# Patient Record
Sex: MALE | Race: WHITE
[De-identification: names, ages, dates, MRNs, and addresses within clinical notes are randomized per-mention and may not be internally consistent; named-entity substitution may affect disease eponyms.]

---

## 2018-07-16 ENCOUNTER — HOSPITAL ENCOUNTER (OUTPATIENT)
Dept: PHYSICAL THERAPY | Facility: CLINIC | Age: 78
Setting detail: THERAPIES SERIES
Discharge: HOME | End: 2018-07-16
Payer: MEDICARE

## 2018-07-16 PROCEDURE — 97110 THERAPEUTIC EXERCISES: CPT

## 2018-07-16 PROCEDURE — G8979 MOBILITY GOAL STATUS: HCPCS

## 2018-07-16 PROCEDURE — G8978 MOBILITY CURRENT STATUS: HCPCS

## 2018-07-16 PROCEDURE — 97162 PT EVAL MOD COMPLEX 30 MIN: CPT

## 2018-07-16 NOTE — FLOWSHEET NOTE
Victor Manuel. Kevin Deedee for [facility]  [address], [facility].  Phone: [phone]  Fax:     [phone]    Physical Therapy Evaluation    Date:  2018  Patient: Nakul Gonzalez  : 1940  MRN: [id number]  Physician: [address]: Medicare  Medical Diagnosis: L lumbar adiculopathy    Rehab Codes: M54.32  Onset Date:                                    Subjective:  Pt reports pain, stiffness of L lumbar region, notes episodes of radiating pain into leg, but not specific to any position or motion. Pt states symptoms began last month while cuing down a large tree, felt strain in back when lifting large log and twisting to R side. No previous hx of back injuries, previous hx of R leg fractue.     PMHx: [] Unremarkable [] Diabetes [] HTN  [] Pacemaker   [] MI/Heart Problems [] Cancer [] Arthritis [] Asthma                         [x] refer to full medical chart  In [facility]  [] Other:        Tests: [x] X-Ray: DDD [] MRI:  [] Other:    Medications: [x] Refer to full medical record [] None [] Other:  Allergies:      [x] Refer to full medical record [] None [] Other:    Function:  Hand Dominance  [x] Right  [] Left  Working:  [] Normal Duty  [] Light Duty  [] Off D/T Condition  [x] Retired     [] Not Employed  []  Disability  [] Other:            Return to work:   Job/ADL Description:    Pain:  [x] Yes  [] No Pain Rating: (0-10 scale) 1/10  Pain altered Tx:  [] Yes  [x] No  Action:    Symptoms:  [] Improving [] Worsening [x] Same  Better:  [] AM    [] PM    [] Sit    [] Rise/Sit    []Stand    [] Walk    [] Lying    [] Other:  Worse: [x] AM    [x] PM    [x] Sit    [x] Rise/Sit    [x]Stand    [x] Walk    [x] Lying    [x] Bend                      [] Valsalva    [] Other:  Sleep: [x] OK    [] Disturbed    Objective:     L/R ROM  ° A/P STRENGTH   Lumbar Flex 80  4-    Ext 10  4    SB 20 20     Rot 70 70     Hip Flex WNL WNL 4 4   Ext WNL WNL 4- 4-     OBSERVATION No Deficit Deficit Not Tested Comments   Posture [x]      Palpation [] [x]     Sensation [x] []       FUNCTION Normal Difficult Unable   Sitting  [x] [] []   Ambulation [] [x] []   Bending [] [x] []   lifting [] [x] []     G CODE FUNCTIONAL SCORE CURRENT GOAL   Extremity Functional Scale Score 14 0   SEVERITY CODE     Mobility: Walking, Moving Around CI: 1-19% impaired (65-79) CH: 0% Impaired (80)       ASSESSMENT   Pt limited by L lumbar pain, ROM loss, neg SLR, neg repeated flex/ext, lumbopelvic weakness w/ limitation in functional activity d/t lumbar strain. Pt able to complete flexion exercises w/o leg symptoms, but noted leg symptoms w/ prone extension. Will focus on flexion exercises. PROBLEMS  Lumbar, L leg pain  Lumbar ROM loss  Lumbar, hip weakness  Lumbar functional deficits    SHORT TERM GOALS ( 8 visits)  Lumbar, L leg pain = 0  Lumbar ROM = WNL  Lumbar, hip strength = 4+/5  Lumbar function: walk, bend, lift w/o pain    LONG TERM GOALS ( 12 visits)  Independent Home Exercise program  Return to normal activity    PATIENT GOAL  Reduce pain, increase flexibility    Rehab Potential:  [x] Good  [] Fair  [] Poor   Suggested Professional Referral:  [x] No  [] Yes:  Barriers to Goal Achievement[de-identified]  [x] No  [] Yes:  Domestic Concerns:  [x] No  [] Yes:    Pt. Education:  [x] Plans/Goals, Risks/Benefits discussed  [x] Home exercise program  Method of Education: [x] Verbal  [x] Demo  [x] Written  Comprehension of Education:  [x] Verbalizes understanding. [x] Demonstrates understanding. [] Needs Review. [] Demonstrates/verbalizes understanding of HEP/Ed previously given.     Treatment Plan:  [x] Therapeutic Exercise    [] Modalities:  [] Therapeutic Activity    [] Ultrasound  [] Electrical Stimulation  [] Gait Training     [] Massage       [] Lumbar/Cervical Traction  [] Neuromuscular Re-education [x] Cold/hotpack [] Instruction in HEP  [] Manual Therapy   [] Aquatic Therapy [] Other:     [] Iontophoresis: 4 mg/mL Dexamethasone Sodium Phosphate 40-80 mAmin  [] Drug allergies reviewed    _______Initials           _______Date     Frequency:  2 x/week for 12 visits    Todays Treatment:     7/16/2018 Visit #1    Exercise Reps/ Time Weight/ Level Comments   Bike 10 min     Stretch DKTC, LTR 10x10s     Bridges 3x10     Pelvic Tilts 3x10         Specific Instructions for next treatment:    Treatment Charges: Mins Units   [x] Evaluation ----- 1   []  Modalities     [x]  Ther Exercise 20 1   []  Manual Therapy     []  Ther Activities     []  Aquatics     []  Other       Time in: 1500     Time out: 1600    Electronically signed by: Lakeshia Mayer PT        Physician Signature:________________________________Date:__________________  By signing above, I have reviewed this plan of care and certify a need for medically   necessary rehabilitation services.      *PLEASE SIGN ABOVE AND FAX BACK ALL PAGES*

## 2018-07-18 ENCOUNTER — HOSPITAL ENCOUNTER (OUTPATIENT)
Dept: PHYSICAL THERAPY | Facility: CLINIC | Age: 78
Setting detail: THERAPIES SERIES
Discharge: HOME | End: 2018-07-18
Payer: MEDICARE

## 2018-07-18 PROCEDURE — 97110 THERAPEUTIC EXERCISES: CPT

## 2018-07-18 NOTE — FLOWSHEET NOTE
cueing with clams and SL hip abduction to ensure proper follow through of technique. Pt denied any increased LBP with completion of treatment with a slight decrease in stiffness noted upon arriving. [] No change. [] Other:     SHORT TERM GOALS ( 8 visits)  Lumbar, L leg pain = 0  Lumbar ROM = WNL  Lumbar, hip strength = 4+/5  Lumbar function: walk, bend, lift w/o pain     LONG TERM GOALS ( 12 visits)  Independent Home Exercise program  Return to normal activity     PATIENT GOAL  Reduce pain, increase flexibility    Pt. Education:  [x] Yes  [] No  [] Reviewed Prior HEP/Ed  Method of Education: [x] Verbal  [x] Demo  [] Written  Comprehension of Education:  [x] Verbalizes understanding. [x] Demonstrates understanding. [] Needs review. [] Demonstrates/verbalizes HEP/Ed previously given. Plan: [x] Continue per plan of care.    [] Other:      Time In: 2:02 pm            Time Out: 3:00 pm    Electronically signed by:  Gayle Melchor PTA

## 2018-07-23 ENCOUNTER — HOSPITAL ENCOUNTER (OUTPATIENT)
Dept: PHYSICAL THERAPY | Facility: CLINIC | Age: 78
Setting detail: THERAPIES SERIES
Discharge: HOME | End: 2018-07-23
Payer: MEDICARE

## 2018-07-23 PROCEDURE — 97110 THERAPEUTIC EXERCISES: CPT

## 2018-07-23 NOTE — FLOWSHEET NOTE
stiffness but denied any pain or soreness. [] No change. [] Other:     SHORT TERM GOALS ( 8 visits)  Lumbar, L leg pain = 0  Lumbar ROM = WNL  Lumbar, hip strength = 4+/5  Lumbar function: walk, bend, lift w/o pain     LONG TERM GOALS ( 12 visits)  Independent Home Exercise program  Return to normal activity     PATIENT GOAL  Reduce pain, increase flexibility    Pt. Education:  [x] Yes  [] No  [] Reviewed Prior HEP/Ed  Method of Education: [x] Verbal  [x] Demo  [] Written  Comprehension of Education:  [x] Verbalizes understanding. [x] Demonstrates understanding. [] Needs review. [] Demonstrates/verbalizes HEP/Ed previously given. Plan: [x] Continue per plan of care.    [] Other:      Time In: 2:00 pm            Time Out: 3:00 pm    Electronically signed by:  Milan Zuniga PTA

## 2018-07-26 ENCOUNTER — HOSPITAL ENCOUNTER (OUTPATIENT)
Dept: PHYSICAL THERAPY | Facility: CLINIC | Age: 78
Setting detail: THERAPIES SERIES
Discharge: HOME | End: 2018-07-26
Payer: MEDICARE

## 2018-07-26 PROCEDURE — 97110 THERAPEUTIC EXERCISES: CPT

## 2018-07-26 NOTE — FLOWSHEET NOTE
with opposite UE/LE. Addition of marches and HS curls with cuing for core engagement. Provided with updated HEP, verbalized understanding of ex provided. [] No change. [] Other:     SHORT TERM GOALS ( 8 visits)  Lumbar, L leg pain = 0  Lumbar ROM = WNL  Lumbar, hip strength = 4+/5  Lumbar function: walk, bend, lift w/o pain     LONG TERM GOALS ( 12 visits)  Independent Home Exercise program  Return to normal activity     PATIENT GOAL  Reduce pain, increase flexibility    Pt. Education:  [x] Yes  [] No  [] Reviewed Prior HEP/Ed  Method of Education: [x] Verbal  [x] Demo  [x] Written: copy in chart  Comprehension of Education:  [x] Verbalizes understanding. [x] Demonstrates understanding. [] Needs review. [] Demonstrates/verbalizes HEP/Ed previously given. Plan: [x] Continue per plan of care.    [] Other:      Time In: 2:00 pm            Time Out: 3:00 pm    Electronically signed by:  Miguelito Campos PTA

## 2018-07-30 ENCOUNTER — HOSPITAL ENCOUNTER (OUTPATIENT)
Dept: PHYSICAL THERAPY | Facility: CLINIC | Age: 78
Setting detail: THERAPIES SERIES
Discharge: HOME | End: 2018-07-30
Payer: MEDICARE

## 2018-07-30 PROCEDURE — 97110 THERAPEUTIC EXERCISES: CPT

## 2018-07-30 NOTE — PRE-CERTIFICATION NOTE
Medicare Cap   [x] Physical Therapy  [] Speech Therapy  [] Occupational therapy  *PT and Speech caps combine      $2010 Cap limit < kx modifier needed < $0657 requires pre-cert        Patient Name: Fredia Goodpasture  YOB: 1940    Note:  This is an estimate of charges billed.      Date of [address] Name # units/ charge $$$ charge Daily Total Charge Ongoing Total $$$   7/16/18 saige Palacios 1,1 80.57+22.99 103.56 103.56   7/18/18 therex 2 29.49+22.99 52.48 156.04   7/23/18 therex 2 29.49+22.99 52.48 208.52   7/26/18 therex 2 29.49+22.99 52.48 260.00   7/30/18 therex 3 11.29+14.96*6 75.47 335.47

## 2018-07-30 NOTE — FLOWSHEET NOTE
[x] Victor Manuel. [address] [facility] [employer] [facility]  [address] [place] [address]   Phone: [phone]   Fax:  [phone]     Physical Therapy Daily Treatment Note    Date:  2018  Patient Name:  Nakul Gonzalez    :  1940  MRN: [id number]  Physician: [address]: Medicare  Medical Diagnosis: L lumbar radiculopathy                Rehab Codes: M54.32  Onset Date:          Visit# / total visits:    Cancels/No Shows: 0/0    Subjective:    Pain:  [x] Yes  [] No Location: LBP Pain Rating: (0-10 scale) 2/10  Pain altered Tx:  [x] No  [] Yes  Action:  Comments: Some minor LB stiffness but states that's common for him if he is in one position for prolonged time. Overall doing good this afternoon. Objective:       Todays Treatment:     Modalities:   Precautions:    2018 Visit #6     Exercise Reps/ Time Weight/ Level Comments   Bike 10 min             Stretch DKTC, LTR, piriformis, HS S 3x30\"  5x10\" LTR       Calf S 3x30\"     Bridges 2x15 red      Clams 2x10 red    SL hip abduction 2x10 red          Pelvic Tilts HEP       DLS: marches, LE kick,  UE/LE x20 ea  1# LE                TGym Squats 2x15 L20    4 way hip x15 red bilat   Mini lunge x10   bilat   Marches x20     HS curls x20           Seated SB marches x15  Blue SB               Specific Instructions for next treatment:      Treatment Charges: Mins Units   []  Modalities     [x]  Ther Exercise 40 3   []  Manual Therapy     []  Ther Activities     []  Aquatics     []  Vasocompression     []  Other     Total Treatment time 40 3       Assessment: [x] Progressing toward goals. Continued with therex per log with good pt tolerance. Bike today for warmup, pt states he prefers the bike because its less stressful on his R knee. Increased TGym level along with increased reps with 4 way hip. Also instructed pt in mini lunges bilaterally for continued LE strengthening, required increased cueing to ensure proper technique.

## 2018-08-01 ENCOUNTER — HOSPITAL ENCOUNTER (OUTPATIENT)
Dept: PHYSICAL THERAPY | Facility: CLINIC | Age: 78
Setting detail: THERAPIES SERIES
Discharge: HOME | End: 2018-08-01
Payer: MEDICARE

## 2018-08-01 PROCEDURE — 97110 THERAPEUTIC EXERCISES: CPT

## 2018-08-06 ENCOUNTER — HOSPITAL ENCOUNTER (OUTPATIENT)
Dept: PHYSICAL THERAPY | Facility: CLINIC | Age: 78
Setting detail: THERAPIES SERIES
Discharge: HOME | End: 2018-08-06
Payer: MEDICARE

## 2018-08-06 PROCEDURE — 97110 THERAPEUTIC EXERCISES: CPT

## 2018-08-06 NOTE — FLOWSHEET NOTE
[x] Joana [facility]  [address] [facility] [address]   Phone: [phone]   Fax:  [phone]     Physical Therapy Daily Treatment Note    Date:  2018  Patient Name:  Rochelle Phipps    :  1940  MRN: [id number]  Physician: [address]: Medicare  Medical Diagnosis: L lumbar radiculopathy                Rehab Codes: M54.32  Onset Date:          Visit# / total visits:    Cancels/No Shows: 0/0    Subjective:    Pain:  [x] Yes  [] No Location: LBP Pain Rating: (0-10 scale) 2/10  Pain altered Tx:  [x] No  [] Yes  Action:  Comments: Pt still reporting LB stiffness upon arriving to the clinic this afternoon. Per pt stiffness has not increased but still present. Objective:       Todays Treatment:     Modalities:   Precautions:    2018 Visit #8     Exercise Reps/ Time Weight/ Level Comments   Bike 10 min             Stretch SKTC, LTR, piriformis, HS S 3x30\"  5x10\" LTR       Calf S 3x30\"           SLR x20 2#    Bridges 2x15 red      Clams 2x10 red    SL hip abduction 2x10 red          Pelvic Tilts HEP       DLS: marches, LE kick,  UE/LE x20 ea  2# LE/UE                TGym Squats/HR 2x15 L20    4 way hip x15 red bilat   Mini lunge x10   bilat   Sidestepping  3L red // bars   PMT back ext 3x10 30#                Specific Instructions for next treatment:      Treatment Charges: Mins Units   []  Modalities     [x]  Ther Exercise 30 2   []  Manual Therapy     []  Ther Activities     []  Aquatics     []  Vasocompression     []  Other     Total Treatment time 30 2       Assessment: [x] Progressing toward goals. Continued with therex with good tolerance. Verbal cueing for recall and technique to ensure proper core engagement throughout. Added PMT lumbar ext ex for low back strengthening, good challenge with no c/o pain. [] No change.      [] Other:     SHORT TERM GOALS ( 8 visits)  Lumbar, L leg pain = 0  Lumbar ROM = WNL  Lumbar, hip strength

## 2018-08-06 NOTE — PRE-CERTIFICATION NOTE
Medicare Cap   [x] Physical Therapy  [] Speech Therapy  [] Occupational therapy  *PT and Speech caps combine      $2010 Cap limit < kx modifier needed < $9947 requires pre-cert        Patient Name: Kelley Lundberg  YOB: 1940    Note:  This is an estimate of charges billed.      Date of [address] Name # units/ charge $$$ charge Daily Total Charge Ongoing Total $$$   7/16/18 Philip theregloria 1,1 80.57+22.99 103.56 103.56   7/18/18 therex 2 29.49+22.99 52.48 156.04   7/23/18 therex 2 29.49+22.99 52.48 208.52   7/26/18 therex 2 29.49+22.99 52.48 260.00   7/30/18 therex 3 12.99+21.05*0 75.47 335.47   8/1/18 therex 3 29.49+22.99*2 75.47 410.94   8/6/18 therex 2 29.49+22.99 52.48 463.42

## 2018-08-08 ENCOUNTER — HOSPITAL ENCOUNTER (OUTPATIENT)
Dept: PHYSICAL THERAPY | Facility: CLINIC | Age: 78
Setting detail: THERAPIES SERIES
Discharge: HOME | End: 2018-08-08
Payer: MEDICARE

## 2018-08-08 PROCEDURE — 97110 THERAPEUTIC EXERCISES: CPT

## 2018-08-08 NOTE — FLOWSHEET NOTE
[x] [facility] [place]. [address] [place] [address] [employer]  [address] [place] [address]   Phone: [phone]   Fax:  [phone]     Physical Therapy Daily Treatment Note    Date:  2018  Patient Name:  Jemima Helton    :  1940  MRN: [id number]  Physician: [address]: Medicare  Medical Diagnosis: L lumbar radiculopathy                Rehab Codes: M54.32  Onset Date:          Visit# / total visits:    Cancels/No Shows: 0/0    Subjective:    Pain:  [x] Yes  [] No Location: LBP Pain Rating: (0-10 scale) 2/10  Pain altered Tx:  [x] No  [] Yes  Action:  Comments: Pt notes feeling better today. Less LB stiffness upon arriving to the clinic this afternoon. Objective:       Todays Treatment:     Modalities:   Precautions:    2018 Visit #8     Exercise Reps/ Time Weight/ Level Comments   Bike 10 min             Stretch SKTC, LTR, piriformis, HS S 3x30\"  5x10\" LTR       Calf S 3x30\"           SLR x20 2#    Bridges 2x15 red      Clams 2x10 red    SL hip abduction 2x10 red          Pelvic Tilts HEP       DLS: marches, LE kick,  UE/LE x20 ea  2# LE/UE                TGym Squats/HR 2x15 L20    4 way hip x20 red bilat   Mini lunge x15   bilat   Mini squats 2x10     Sidestepping  3L red // bars   PMT back ext 3x10 40#                Specific Instructions for next treatment:      Treatment Charges: Mins Units   []  Modalities     [x]  Ther Exercise 30 2   []  Manual Therapy     []  Ther Activities     []  Aquatics     []  Vasocompression     []  Other     Total Treatment time 30 2       Assessment: [x] Progressing toward goals. Continued with therex per log with good pt tolerance. Increased reps of 4 way hip, addition of mini squats, and increased weight with PMt lumbar ext ex to promote LE and core strengthening with good tolerance. Still requiring verbal cueing for recall throughout along with cues for proper technique. Still displaying LE weakness but improving.  Denied any

## 2018-08-08 NOTE — PRE-CERTIFICATION NOTE
Medicare Cap   [x] Physical Therapy  [] Speech Therapy  [] Occupational therapy  *PT and Speech caps combine      $2010 Cap limit < kx modifier needed < $1106 requires pre-cert        Patient Name: Nakul Gonzalez  YOB: 1940    Note:  This is an estimate of charges billed.      Date of [address] Name # units/ charge $$$ charge Daily Total Charge Ongoing Total $$$   7/16/18 saige Palacios 1,1 80.57+22.99 103.56 103.56   7/18/18 therex 2 29.49+22.99 52.48 156.04   7/23/18 therex 2 29.49+22.99 52.48 208.52   7/26/18 therex 2 29.49+22.99 52.48 260.00   7/30/18 therex 3 79.86+34.72*3 75.47 335.47   8/1/18 therex 3 29.49+22.99*2 75.47 410.94   8/6/18 therex 2 29.49+22.99 52.48 463.42   8/8/18 therex 2 29.49+22.99 52.48 515.90

## 2018-08-13 ENCOUNTER — HOSPITAL ENCOUNTER (OUTPATIENT)
Dept: PHYSICAL THERAPY | Facility: CLINIC | Age: 78
Setting detail: THERAPIES SERIES
Discharge: HOME | End: 2018-08-13
Payer: MEDICARE

## 2018-08-13 PROCEDURE — 97110 THERAPEUTIC EXERCISES: CPT

## 2018-08-13 NOTE — FLOWSHEET NOTE
[x] Victor Manuel. [address] [facility] [employer]  [address] [place] [address]   Phone: [phone]   Fax:  [phone]     Physical Therapy Daily Treatment Note    Date:  2018  Patient Name:  Duarte Santana    :  1940  MRN: [id number]  Physician: [address]: Medicare  Medical Diagnosis: L lumbar radiculopathy                Rehab Codes: M54.32  Onset Date:          Visit# / total visits:    Cancels/No Shows: 0/0    Subjective: Pt reports feeling improved w/ less back/leg pain overall, but has noted over last 3 wks that his left foot tends to darg while walking longer distances  Pain:  [x] Yes  [] No Location: LBP Pain Rating: (0-10 scale) 2/10  Pain altered Tx:  [x] No  [] Yes  Action:  Comments:     Objective:  MMT L ankle DF = 4-/5, EHL = 4-/5, PF = 4/5     Todays Treatment:     Modalities:   Precautions:    2018  Visit #9     Exercise Reps/ Time Weight/ Level Comments   Bike 10 min             Stretch SKTC, LTR, piriformis, HS S 3x30\"  5x10\" LTR       Calf S 3x30\"           SLR x20 2#    Bridges 2x15 red      Clams 2x10 red    SL hip abduction 2x10 red          Pelvic Tilts HEP       SB seated: hip flex, biceps curls, LAQ, delt raise x1x10  2# UE  0# LE Unable to combine motions   Tband DF 3x10 red          TGym L/R Squats/HR 2x15 L15    4 way hip x20 red bilat   BOSU lunge x15   fwd   Mini squats 2x10     Sidestepping  3L red    PMT back ext 3x10 40#                Specific Instructions for next treatment:      Treatment Charges: Mins Units   []  Modalities     [x]  Ther Exercise 30 2   []  Manual Therapy     []  Ther Activities     []  Aquatics     []  Vasocompression     []  Other     Total Treatment time 30 2       Assessment: [x] Progressing toward goals. [] No change. [x] Other: Pt noted to have DF weakness of L foot, given HEP Tband  Exercises.  Pt challenged by exercises w/ unstable surfaces needs cuing to slow motions to control balance. SHORT TERM GOALS ( 8 visits)  Lumbar, L leg pain = 0  Lumbar ROM = WNL  Lumbar, hip strength = 4+/5  Lumbar function: walk, bend, lift w/o pain     LONG TERM GOALS ( 12 visits)  Independent Home Exercise program  Return to normal activity     PATIENT GOAL  Reduce pain, increase flexibility    Pt. Education:  [x] Yes  [] No  [] Reviewed Prior HEP/Ed  Method of Education: [x] Verbal  [x] Demo  [] Written: copy in chart  Comprehension of Education:  [] Verbalizes understanding. [x] Demonstrates understanding. [x] Needs review. [] Demonstrates/verbalizes HEP/Ed previously given. Plan: [x] Continue per plan of care.    [] Other:      Time In: 2:00 pm            Time Out: 3:00 pm    Electronically signed by:  Lakeshia Mayer PT

## 2018-08-15 ENCOUNTER — HOSPITAL ENCOUNTER (OUTPATIENT)
Dept: PHYSICAL THERAPY | Facility: CLINIC | Age: 78
Setting detail: THERAPIES SERIES
Discharge: HOME | End: 2018-08-15
Payer: MEDICARE

## 2018-08-15 PROCEDURE — G8978 MOBILITY CURRENT STATUS: HCPCS

## 2018-08-15 PROCEDURE — G8979 MOBILITY GOAL STATUS: HCPCS

## 2018-08-15 PROCEDURE — 97110 THERAPEUTIC EXERCISES: CPT

## 2018-08-20 ENCOUNTER — HOSPITAL ENCOUNTER (OUTPATIENT)
Dept: PHYSICAL THERAPY | Facility: CLINIC | Age: 78
Setting detail: THERAPIES SERIES
Discharge: HOME | End: 2018-08-20
Payer: MEDICARE

## 2018-08-20 PROCEDURE — 97110 THERAPEUTIC EXERCISES: CPT

## 2018-08-20 NOTE — FLOWSHEET NOTE
[x] [facility]. [address] [place] [address] [employer]  [facility]   Phone: [phone]   Fax:  [phone]     Physical Therapy Daily Treatment Note    Date:  2018  Patient Name:  Kiesha Dale    :  1940  MRN: [id number]  Physician: [address]: Medicare  Medical Diagnosis: L lumbar radiculopathy                Rehab Codes: M54.32  Onset Date:          Visit# / total visits: 11   Cancels/No Shows: 0/0    Subjective: Pt arrives with just some minor LB stiffness to report but continues to feel that is has made good improvement. Pain:  [x] Yes  [] No Location: LBP Pain Rating: (0-10 scale) 2/10  Pain altered Tx:  [x] No  [] Yes  Action:  Comments:     Objective:       Todays Treatment:     Modalities:   Precautions:    2018  Visit #11     Exercise Reps/ Time Weight/ Level Comments   Bike 10 min             Stretch SKTC, LTR, piriformis, HS S 3x30\"  5x10\" LTR       Calf S 3x30\"           SLR x20 2#    Bridges 2x15 green     Clams 2x10 green    SL hip abduction 2x10 green          Pelvic Tilts HEP       SB seated: hip flex, biceps curls, LAQ, delt raise x1x10  2# UE  0# LE Unable to combine motions   Tband DF 3x10 red          TGym L/R Squats/HR 2x15 L15    4 way hip x20 red bilat   BOSU lunge x15   fwd   Mini squats 2x10     Sidestepping  3L red    PMT back ext 3x10 50#                Specific Instructions for next treatment:      Treatment Charges: Mins Units   []  Modalities     [x]  Ther Exercise 30 2   []  Manual Therapy     []  Ther Activities     []  Aquatics     []  Vasocompression     []  Other     Total Treatment time 30 2       Assessment: [x] Progressing toward goals. [] No change. [x] Other: Continued with therex per log with good pt tolerance. Increased resistance with mat ex with good tolerance but increased difficulty noted. Cueing with SL hip abduction to ensure proper technique.  Balance improving but still requires UE

## 2018-09-05 ENCOUNTER — HOSPITAL ENCOUNTER (OUTPATIENT)
Dept: PHYSICAL THERAPY | Facility: CLINIC | Age: 78
Setting detail: THERAPIES SERIES
Discharge: HOME | End: 2018-09-05
Payer: MEDICARE

## 2018-11-01 ENCOUNTER — HOSPITAL ENCOUNTER (OUTPATIENT)
Dept: PHYSICAL THERAPY | Facility: CLINIC | Age: 78
Setting detail: THERAPIES SERIES
Discharge: HOME | End: 2018-11-01
Payer: MEDICARE

## 2018-11-01 PROCEDURE — 97161 PT EVAL LOW COMPLEX 20 MIN: CPT

## 2018-11-01 PROCEDURE — 97110 THERAPEUTIC EXERCISES: CPT

## 2018-11-01 PROCEDURE — 97116 GAIT TRAINING THERAPY: CPT

## 2018-11-01 PROCEDURE — G8978 MOBILITY CURRENT STATUS: HCPCS

## 2018-11-01 PROCEDURE — G8979 MOBILITY GOAL STATUS: HCPCS

## 2018-11-01 NOTE — FLOWSHEET NOTE
[facility] Fall Risk Assessment    Patient Name:  Ceci Lopez  : 1940        Risk Factor Scale  Score   History of Falls [x] Yes  [] No 25  0 25   Secondary Diagnosis [] Yes  [x] No 15  0 0   Ambulatory Aid [] Furniture  [x] Crutches/cane/walker  [] None/bedrest/wheelchair/nurse 30  15  0 15   IV/Heparin Lock [] Yes  [x] No 20  0 0   Gait/Transferring [x] Impaired  [] Weak  [] Normal/bedrest/immobile 20  10  0 20   Mental Status [] Forgets limitations  [x] Oriented to own ability 15  0 0      Total:60     Based on the Assessment score: check the appropriate box.     []  No intervention needed   Low =   Score of 0-24    []  Use standard prevention interventions Moderate =  Score of 24-44   [] Give patient handout and discuss fall prevention strategies   [] Establish goal of education for patient/family RE: fall prevention strategies    [x]  Use high risk prevention interventions High = Score of 45 and higher   [x] Give patient handout and discuss fall prevention strategies   [x] Establish goal of education for patient/family Re: fall prevention strategies   [x] Discuss lifeline / other resources    Electronically signed by:   Gen Wolf PT  Date: 2018

## 2018-11-06 ENCOUNTER — HOSPITAL ENCOUNTER (OUTPATIENT)
Dept: PHYSICAL THERAPY | Facility: CLINIC | Age: 78
Setting detail: THERAPIES SERIES
Discharge: HOME | End: 2018-11-06
Payer: MEDICARE

## 2018-11-06 PROCEDURE — 97110 THERAPEUTIC EXERCISES: CPT

## 2018-11-06 NOTE — FLOWSHEET NOTE
[] Romana Gosling       Outpatient Physical        Therapy       [address]       Phone: [phone]       Fax: [phone] [] [facility] at [address] [facility]       Phone: [phone]       Fax: [phone] [x] Victor Manuel. [address] [place] [address]   Phone: [phone]   Fax:  [phone]     Physical Therapy Daily Treatment Note    Date:  2018  Patient Name:  Dylan Roberson     :  1940  MRN: [id number]  Physician: Dr. Hernandez Juan MD                 Insurance: Medicare/Medical [place]  Medical Diagnosis: Right Broken Femur                 Rehab Codes: M79.651  Onset date: 2018                Next 's appt.: 2018  Visit# / total visits:     Cancels/No Shows: 0/0    Subjective: Continued RLE soreness and just minor pain. Still abm with 4 wheel walk and step to gait pattern to adhere to 50% WB. Pain:  [x] Yes  [] No Location: RLE Pain Rating: (0-10 scale) 1-2/10  Pain altered Tx:  [x] No  [] Yes  Action:  Comments:    Objective:     Todays Treatment:  Modalities:   Precautions: 50% WEIGHTBEARING 3 weeks (11/15/18 to assess for full WB)  Exercises:  Exercise Reps/ Time Weight/ Level Comments   Alter G     50% Weightbearing, small shorts   Walking Forward 10' Level, 1.0 mph Emphasizing symmetrical step length   Incline Walking Fwd 8' 1% grade, 1 mph Symmetrical step length   HR  x20     3 way hip  x20   Stance on R LE   Marching  x20   Bilaterally   Knee Flexion  x20   Bilaterally                   MAT      Quad set  10x10\"     Heel slides  x20     Glut sets  10x10\"     Supine hip abduction    x20        Other:     Specific Instructions for next treatment:      Treatment Charges: Mins Units   []  Modalities     [x]  Ther Exercise 45 3   []  Manual Therapy     []  Ther Activities     []  Aquatics     []  Vasocompression     []  Other     Total Treatment time 40 3 Assessment: [x] Progressing toward goals. Initiated pt in AlterG training at 50% BWS with good pt tolerance. Used visual cues on monitor along with verbal cueing for pt to focus on increased step lengthening and ensure equal step length with good follow through demonstrated. Also initiated LE strengthening in AlterG at 50% BWS with good pt tolerance as well. Pt denied pain with AlterG this date. Also instructed pt in mat LE strengthening and ROM ex to promote improved function. Will promote pt and sofia per protocol. [] No change. [] Other:    STG: (to be met in 8 treatments)  1. ? Pain: Patient to report decreased hip pain to <4/10 within 4 weeks for improved activity tolerance  2. ? ROM: Patient to demonstrate improved knee AROM to >100 flexion, neutral extension within 4 weeks for improved ability to perform ADL's and community ambulation  3. ? Strength: Patient will demonstrate improved hip strength to 4/5 within 4 weeks for improved ability to control LE and decrease substitution. 4. Independent with Home Exercise Programs  5. Demonstrate Knowledge of fall prevention  LTG: (to be met in 16 treatments)  1. Patient will demonstrate improved hip strength to 5/5 within 8 weeks for improved ability to negotiate uneven terrain and ambulate in community without abnormality. 2. Patient to demonstrate improved knee AROM to >110 flexion, neutral extension, >110 hip flexion, >15 hip extension within 8 weeks for symmetry bilaterally. 3. Patient to demonstrate improved LEFS score to >60/80 within 8 weeks for improved function.                    Patient goals: Decrease pain and improve mobility    Pt. Education:  [x] Yes  [] No  [] Reviewed Prior HEP/Ed  Method of Education: [x] Verbal  [] Demo  [] Written  Comprehension of Education:  [x] Verbalizes understanding. [x] Demonstrates understanding. [] Needs review. [] Demonstrates/verbalizes HEP/Ed previously given.      Plan: [x] Continue per plan of

## 2018-11-08 ENCOUNTER — HOSPITAL ENCOUNTER (OUTPATIENT)
Dept: PHYSICAL THERAPY | Facility: CLINIC | Age: 78
Setting detail: THERAPIES SERIES
Discharge: HOME | End: 2018-11-08
Payer: MEDICARE

## 2018-11-08 PROCEDURE — 97110 THERAPEUTIC EXERCISES: CPT

## 2018-11-13 ENCOUNTER — HOSPITAL ENCOUNTER (OUTPATIENT)
Dept: PHYSICAL THERAPY | Facility: CLINIC | Age: 78
Setting detail: THERAPIES SERIES
Discharge: HOME | End: 2018-11-13
Payer: MEDICARE

## 2018-11-13 PROCEDURE — 97110 THERAPEUTIC EXERCISES: CPT

## 2018-11-13 NOTE — FLOWSHEET NOTE
[] [facility]       Outpatient Physical        Therapy       [address]       Phone: [phone]       Fax: [phone] [] [facility] at [address] [facility]       Phone: [phone]       Fax: [phone] [x] Victor Manuel. [address] [facility]  [address]    Phone: [phone]   Fax:  [phone]     Physical Therapy Daily Treatment Note    Date:  2018  Patient Name:  Jodi Avalos     :  1940  MRN: [id number]  Physician: Dr. Viry Gomez MD                 Insurance: Medicare/Medical [place]  Medical Diagnosis: Right Broken Femur                 Rehab Codes: M79.651  Onset date: 2018                Next 's appt.: 2018  Visit# / total visits:     Cancels/No Shows: 0/0    Subjective: Mild RLE soreness but overall pt states he feels ok. No significant pain at this time. Pain:  [x] Yes  [] No Location: RLE Pain Rating: (0-10 scale) 1-2/10  Pain altered Tx:  [x] No  [] Yes  Action:  Comments:    Objective:     Todays Treatment:  Modalities:   Precautions: 50% WEIGHTBEARING 3 weeks (11/15/18 to assess for full WB)  Exercises:  Exercise Reps/ Time Weight/ Level Comments   Sci-Fit             Alter G     50% Weightbearing, small shorts   Walking Forward 10' Level, 1.5 mph Emphasizing symmetrical step length   Incline Walking Fwd 5' 1% grade, 1.5 mph Symmetrical step length   HR  x25     3 way hip  x25   Stance on R LE   Marching  x25   Bilaterally   Knee Flexion  x25   Bilaterally   Squats  x25              MAT      Supine HS stretch  3x30\"  rope   Quad set  HEP     Heel slides  x30     Glut sets  HEP     Supine hip abduction    x30        Supine clams, SL clams  3x10  red    SLR   x20           Other:     Specific Instructions for next treatment:      Treatment Charges: Mins Units   []  Modalities     [x]  Ther Exercise 45 3   []  Manual Therapy     []  Ther Activities     []

## 2018-11-15 ENCOUNTER — HOSPITAL ENCOUNTER (OUTPATIENT)
Dept: PHYSICAL THERAPY | Facility: CLINIC | Age: 78
Setting detail: THERAPIES SERIES
Discharge: HOME | End: 2018-11-15
Payer: MEDICARE

## 2018-11-15 PROCEDURE — 97110 THERAPEUTIC EXERCISES: CPT

## 2018-11-15 PROCEDURE — 97016 VASOPNEUMATIC DEVICE THERAPY: CPT

## 2018-11-20 ENCOUNTER — HOSPITAL ENCOUNTER (OUTPATIENT)
Dept: PHYSICAL THERAPY | Facility: CLINIC | Age: 78
Setting detail: THERAPIES SERIES
Discharge: HOME | End: 2018-11-20
Payer: MEDICARE

## 2018-11-20 PROCEDURE — 97110 THERAPEUTIC EXERCISES: CPT

## 2018-11-20 PROCEDURE — 97016 VASOPNEUMATIC DEVICE THERAPY: CPT

## 2018-11-27 ENCOUNTER — HOSPITAL ENCOUNTER (OUTPATIENT)
Dept: PHYSICAL THERAPY | Facility: CLINIC | Age: 78
Setting detail: THERAPIES SERIES
Discharge: HOME | End: 2018-11-27
Payer: MEDICARE

## 2018-11-27 PROCEDURE — 97110 THERAPEUTIC EXERCISES: CPT

## 2018-11-27 PROCEDURE — 97016 VASOPNEUMATIC DEVICE THERAPY: CPT

## 2018-11-27 NOTE — FLOWSHEET NOTE
Demonstrates/verbalizes HEP/Ed previously given. Plan: [x] Continue per plan of care.    [] Other:      Time In: 12:30pm         Time Out: 1:35pm    Electronically signed by:  Pat Raymond PT

## 2018-11-29 ENCOUNTER — HOSPITAL ENCOUNTER (OUTPATIENT)
Dept: PHYSICAL THERAPY | Facility: CLINIC | Age: 78
Setting detail: THERAPIES SERIES
Discharge: HOME | End: 2018-11-29
Payer: MEDICARE

## 2018-11-29 PROCEDURE — 97016 VASOPNEUMATIC DEVICE THERAPY: CPT

## 2018-11-29 PROCEDURE — 97110 THERAPEUTIC EXERCISES: CPT

## 2018-11-29 NOTE — FLOWSHEET NOTE
Other: Vaso for post-exercise soreness/inflammation control - 15' post ex     Specific Instructions for next treatment: Continue CKC strengthening and progress SLS balance activities! !      Treatment Charges: Mins Units   []  Modalities     [x]  Ther Exercise 45 3   []  Manual Therapy     []  Ther Activities     []  Aquatics     [x]  Vasocompression 15 1   []  Other     Total Treatment time 60 4       Assessment: [x] Progressing toward goals. Continued with amb with no AD CGA with verbal cues to increase step length with LLE for normalized gait pattern with fair follow through. Pt verbalized increased fatigue with progression through treatment but denied any increase in pain. Increased resistance with 3 way hip and increased reps with steps for continued LE strengthening. Continued with vaso post ex for symptom control with good pt response. [] No change. [] Other:    STG: (to be met in 8 treatments)  1. ? Pain: Patient to report decreased hip pain to <4/10 within 4 weeks for improved activity tolerance  2. ? ROM: Patient to demonstrate improved knee AROM to >100 flexion, neutral extension within 4 weeks for improved ability to perform ADL's and community ambulation  3. ? Strength: Patient will demonstrate improved hip strength to 4/5 within 4 weeks for improved ability to control LE and decrease substitution. 4. Independent with Home Exercise Programs  5. Demonstrate Knowledge of fall prevention  LTG: (to be met in 16 treatments)  1. Patient will demonstrate improved hip strength to 5/5 within 8 weeks for improved ability to negotiate uneven terrain and ambulate in community without abnormality. 2. Patient to demonstrate improved knee AROM to >110 flexion, neutral extension, >110 hip flexion, >15 hip extension within 8 weeks for symmetry bilaterally.   3. Patient to demonstrate improved LEFS score to >60/80 within 8 weeks for improved function.                    Patient goals: Decrease pain and

## 2018-12-04 ENCOUNTER — HOSPITAL ENCOUNTER (OUTPATIENT)
Dept: PHYSICAL THERAPY | Facility: CLINIC | Age: 78
Setting detail: THERAPIES SERIES
Discharge: HOME | End: 2018-12-04
Payer: MEDICARE

## 2018-12-04 PROCEDURE — 97110 THERAPEUTIC EXERCISES: CPT

## 2018-12-04 PROCEDURE — 97016 VASOPNEUMATIC DEVICE THERAPY: CPT

## 2018-12-04 NOTE — PRE-CERTIFICATION NOTE
Medicare Cap   [x] Physical Therapy  [] Speech Therapy  [] Occupational therapy  *PT and Speech caps combine      $2010 Cap limit < kx modifier needed < $8692 requires pre-cert        Patient Name: Santhosh Romo  YOB: 1940    Note:  This is an estimate of charges billed.      Date of [address] Name # units/ charge $$$ charge Daily Total Charge Ongoing Total $$$   11/1/18 EVAL+TE+GAIT 1+1+1 $80.57+29.49+22.83 132.89 $132.89   11/6/18 therex 3 47.42+69.54*5 75.47 $208.36   11/8/18 therex 3 47.02+30.15*4 75.47 283.86   11/13/18 therex 3 49.46+63.12*0 75.47 359.33   11/15/18 2TE+vaso 2+1 29.49+22.99+12.54 65.02 $424.35   11/20/18 Therex, vaso 2,1 99.39+14.62+ 12.54 65.02 489.37   11/27/18 3TE, vaso 3+1 29.49+22.99*2+12.54 88.01 577.38   11/29/18 3 TE, vaso 3=1 29.49+22.99+ 22.99+12.54 88.01 665.39   12/4/18 2 TE, vaso 2+1 29.49+22.99+ 12.54 65.02 730.41

## 2018-12-06 ENCOUNTER — HOSPITAL ENCOUNTER (OUTPATIENT)
Dept: PHYSICAL THERAPY | Facility: CLINIC | Age: 78
Setting detail: THERAPIES SERIES
Discharge: HOME | End: 2018-12-06
Payer: MEDICARE

## 2018-12-06 PROCEDURE — G8978 MOBILITY CURRENT STATUS: HCPCS

## 2018-12-06 PROCEDURE — 97110 THERAPEUTIC EXERCISES: CPT

## 2018-12-06 PROCEDURE — G8979 MOBILITY GOAL STATUS: HCPCS

## 2018-12-06 PROCEDURE — 97116 GAIT TRAINING THERAPY: CPT

## 2018-12-06 NOTE — FLOWSHEET NOTE
extension x          Other:      Specific Instructions for next treatment: Resume exercises emphasizing strengthening. Continue progressing eccentric strengthening for R LE. No AD used throughout treatment session. Begin working towards balance activities on R LE as well (and R gastroc strengthening)      Treatment Charges: Mins Units   []  Modalities     [x]  Ther Exercise 25 2   []  Manual Therapy     []  Ther Activities     []  Aquatics     []  Vasocompression     [x]  Other: Gait Tr 15 1   Total Treatment time 45 3       Assessment: [x] Progressing toward goals. See Progress Note. Strengthening decreased this visit d/t time constraints with PN as well as increased muscle fatigue and delayed onset muscle soreness. Instead emphasized gait this visit and extending R hip with full L step length. [] No change. [] Other:    STG: (to be met in 8 treatments)  1. ? Pain: Patient to report decreased hip pain to <4/10 within 4 weeks for improved activity tolerance  2. ? ROM: Patient to demonstrate improved knee AROM to >100 flexion, neutral extension within 4 weeks for improved ability to perform ADL's and community ambulation  3. ? Strength: Patient will demonstrate improved hip strength to 4/5 within 4 weeks for improved ability to control LE and decrease substitution. 4. Independent with Home Exercise Programs  5. Demonstrate Knowledge of fall prevention  LTG: (to be met in 16 treatments)  1. Patient will demonstrate improved hip strength to 5/5 within 8 weeks for improved ability to negotiate uneven terrain and ambulate in community without abnormality. 2. Patient to demonstrate improved knee AROM to >110 flexion, neutral extension, >110 hip flexion, >15 hip extension within 8 weeks for symmetry bilaterally. 3. Patient to demonstrate improved LEFS score to >60/80 within 8 weeks for improved function.                    Patient goals: Decrease pain and improve mobility    Pt.  Education:  [x] Yes  [] No

## 2018-12-06 NOTE — PRE-CERTIFICATION NOTE
Medicare Cap   [x] Physical Therapy  [] Speech Therapy  [] Occupational therapy  *PT and Speech caps combine      $2010 Cap limit < kx modifier needed < $7201 requires pre-cert        Patient Name: Paramjit Drummond  YOB: 1940    Note:  This is an estimate of charges billed.      Date of [address] Name # units/ charge $$$ charge Daily Total Charge Ongoing Total $$$   11/1/18 EVAL+TE+GAIT 1+1+1 $80.57+29.49+22.83 132.89 $132.89   11/6/18 therex 3 36.62+48.79*1 75.47 $208.36   11/8/18 therex 3 12.84+68.10*6 75.47 283.86   11/13/18 therex 3 40.07+00.30*8 75.47 359.33   11/15/18 2TE+vaso 2+1 29.49+22.99+12.54 65.02 $424.35   11/20/18 Therex, vaso 2,1 71.72+27.38+ 12.54 65.02 489.37   11/27/18 3TE, vaso 3+1 29.49+22.99*2+12.54 88.01 577.38   11/29/18 3 TE, vaso 3=1 29.49+22.99+ 22.99+12.54 88.01 665.39   12/4/18 2 TE, vaso 2+1 29.49+22.99+ 12.54 65.02 730.41   12/6/18 2TE+gait 3 29.49*2+22.83 68.81 799.22

## 2018-12-11 ENCOUNTER — HOSPITAL ENCOUNTER (OUTPATIENT)
Dept: PHYSICAL THERAPY | Facility: CLINIC | Age: 78
Setting detail: THERAPIES SERIES
Discharge: HOME | End: 2018-12-11
Payer: MEDICARE

## 2018-12-11 PROCEDURE — 97110 THERAPEUTIC EXERCISES: CPT

## 2018-12-11 PROCEDURE — 97016 VASOPNEUMATIC DEVICE THERAPY: CPT

## 2018-12-11 NOTE — FLOWSHEET NOTE
[] Sarika Smith       Outpatient Physical        Therapy       [address]       Phone: [phone]       Fax: [phone] [] [facility] at [address] [facility]       Phone: [phone]       Fax: [phone] [x] [facility]  [address]   Phone: [phone]   Fax:  [phone]     Physical Therapy Daily Treatment Note    Date:  2018  Patient Name:  Harris Lee     :  1940  MRN: [id number]  Physician: Dr. Barbara Gonzales MD                 Insurance: Medicare/Medical [place]  Medical Diagnosis: Right Broken Femur                 Rehab Codes: M79.651  Onset date: 2018                Next 's appt.: 2018  Visit# / total visits:     Cancels/No Shows: 0/0    Subjective:    Pain:  [x] Yes  [] No Location: RLE Pain Rating: (0-10 scale) 1-2/10  Pain altered Tx:  [x] No  [] Yes  Action:  Comments: Patient reports some muscular soreness after last visit however feeling \"quite a bit better. \"  He states his balance feels fine overall and he seems to continue to get stronger with physical therapy.     Objective: See PN    Todays Treatment:  Modalities: Vasopneumatic compression R LE 10' for post-exercise soreness  Precautions: WBAT R LE  Exercises:  Exercise Reps/ Time Weight/ Level Comments    TM Walking 8' 1.3mph VC for increased L step length x          Sit to Stands 2x10  Parallel bars x   TG SL squat 3x10 L20 VC for neutral knee x   Step Up Fwd 2x10 4'' Up with R LE down with L LE, NO HANDS x   Step Up Lat 2x10 4'' NO HANDS gait belt support x   Step Up Retro 10x 2'' NO HANDS gait belt support x   Side Stepping 20'x4 yellow Band at knees     Heel to toe taps 2x15  SLS RLE, tapping heel to toe with LLE    Lat Tap Downs 2x10 4'' [employer] CGA UA assist x   4 way hip  20x ea B red  x          SLS 4x30''  CGA for balance x   Balance Board 4x30'' L2 CGA for balance x

## 2018-12-11 NOTE — PRE-CERTIFICATION NOTE
Medicare Cap   [x] Physical Therapy  [] Speech Therapy  [] Occupational therapy  *PT and Speech caps combine      $2010 Cap limit < kx modifier needed < $3505 requires pre-cert        Patient Name: Kimberly Kat  YOB: 1940    Note:  This is an estimate of charges billed.      Date of [address] Name # units/ charge $$$ charge Daily Total Charge Ongoing Total $$$   11/1/18 EVAL+TE+GAIT 1+1+1 $80.57+29.49+22.83 132.89 $132.89   11/6/18 therex 3 77.32+91.92*2 75.47 $208.36   11/8/18 therex 3 60.32+31.81*8 75.47 283.86   11/13/18 therex 3 40.49+63.71*7 75.47 359.33   11/15/18 2TE+vaso 2+1 29.49+22.99+12.54 65.02 $424.35   11/20/18 Therex, vaso 2,1 61.94+80.28+ 12.54 65.02 489.37   11/27/18 3TE, vaso 3+1 29.49+22.99*2+12.54 88.01 577.38   11/29/18 3 TE, vaso 3=1 25.54+89.57+ 22.99+12.54 88.01 665.39   12/4/18 2 TE, vaso 2+1 29.49+22.99+ 12.54 65.02 730.41   12/6/18 2TE+gait 3 29.49*2+22.83 68.81 799.22   12/11/18 3TE, vaso 4 29.49+22.99*2+12.54 88.01 887.23

## 2018-12-13 ENCOUNTER — HOSPITAL ENCOUNTER (OUTPATIENT)
Dept: PHYSICAL THERAPY | Facility: CLINIC | Age: 78
Setting detail: THERAPIES SERIES
Discharge: HOME | End: 2018-12-13
Payer: MEDICARE

## 2018-12-13 PROCEDURE — 97110 THERAPEUTIC EXERCISES: CPT

## 2018-12-13 NOTE — PRE-CERTIFICATION NOTE
Medicare Cap   [x] Physical Therapy  [] Speech Therapy  [] Occupational therapy  *PT and Speech caps combine      $2010 Cap limit < kx modifier needed < $8081 requires pre-cert        Patient Name: Wilbur Araiza  YOB: 1940    Note:  This is an estimate of charges billed.      Date of [address] Name # units/ charge $$$ charge Daily Total Charge Ongoing Total $$$   11/1/18 EVAL+TE+GAIT 1+1+1 $80.57+29.49+22.83 132.89 $132.89   11/6/18 therex 3 93.65+55.61*3 75.47 $208.36   11/8/18 therex 3 12.58+20.39*4 75.47 283.86   11/13/18 therex 3 77.86+32.28*6 75.47 359.33   11/15/18 2TE+vaso 2+1 29.49+22.99+12.54 65.02 $424.35   11/20/18 Therex, vaso 2,1 03.30+42.62+ 12.54 65.02 489.37   11/27/18 3TE, vaso 3+1 29.49+22.99*2+12.54 88.01 577.38   11/29/18 3 TE, vaso 3=1 64.31+58.29+ 22.99+12.54 88.01 665.39   12/4/18 2 TE, vaso 2+1 29.49+22.99+ 12.54 65.02 730.41   12/6/18 2TE+gait 3 29.49*2+22.83 68.81 799.22   12/11/18 3TE, vaso 4 29.49+22.99*2+12.54 88.01 887.23   12/13/18 3TE 3 29.49+22.99*2 75.47 962.70

## 2018-12-13 NOTE — FLOWSHEET NOTE
3x10 Grn  x          Other:      Specific Instructions for next treatment: Progress balance-based activity and use gait belt to ensure stability throughout. Perform step-based exercises without UE support. Treatment Charges: Mins Units   []  Modalities     [x]  Ther Exercise 43 3   []  Manual Therapy     []  Ther Activities     []  Aquatics     []  Vasocompression     []  Other: Total Treatment time 45 3       Assessment: [x] Progressing toward goals. Patient demonstrating improving strength and balance without use of AD at this time. VC and tactile cueing required for patient to maintain balance over R LE to center COG. Miracle required throughout omer walking as well as with SLS balance-based tasks. Patient continues to require skilled physical therapy for progressive eccentric strengthening as well as progressing static and dynamic-based activities to prepare patient for safety with community ambulation without AD.      [] No change. [] Other:    STG: (to be met in 8 treatments)  1. ? Pain: Patient to report decreased hip pain to <4/10 within 4 weeks for improved activity tolerance  2. ? ROM: Patient to demonstrate improved knee AROM to >100 flexion, neutral extension within 4 weeks for improved ability to perform ADL's and community ambulation  3. ? Strength: Patient will demonstrate improved hip strength to 4/5 within 4 weeks for improved ability to control LE and decrease substitution. 4. Independent with Home Exercise Programs  5. Demonstrate Knowledge of fall prevention  LTG: (to be met in 16 treatments)  1. Patient will demonstrate improved hip strength to 5/5 within 8 weeks for improved ability to negotiate uneven terrain and ambulate in community without abnormality. 2. Patient to demonstrate improved knee AROM to >110 flexion, neutral extension, >110 hip flexion, >15 hip extension within 8 weeks for symmetry bilaterally.   3. Patient to demonstrate improved LEFS score to >60/80 within 8

## 2018-12-18 ENCOUNTER — HOSPITAL ENCOUNTER (OUTPATIENT)
Dept: PHYSICAL THERAPY | Facility: CLINIC | Age: 78
Setting detail: THERAPIES SERIES
Discharge: HOME | End: 2018-12-18
Payer: MEDICARE

## 2018-12-18 PROCEDURE — 97110 THERAPEUTIC EXERCISES: CPT

## 2018-12-20 ENCOUNTER — HOSPITAL ENCOUNTER (OUTPATIENT)
Dept: PHYSICAL THERAPY | Facility: CLINIC | Age: 78
Setting detail: THERAPIES SERIES
Discharge: HOME | End: 2018-12-20
Payer: MEDICARE

## 2018-12-20 PROCEDURE — 97110 THERAPEUTIC EXERCISES: CPT

## 2018-12-24 ENCOUNTER — HOSPITAL ENCOUNTER (OUTPATIENT)
Dept: PHYSICAL THERAPY | Facility: CLINIC | Age: 78
Setting detail: THERAPIES SERIES
Discharge: HOME | End: 2018-12-24
Payer: MEDICARE

## 2018-12-24 PROCEDURE — 97110 THERAPEUTIC EXERCISES: CPT

## 2018-12-24 NOTE — PRE-CERTIFICATION NOTE
Medicare Cap   [x] Physical Therapy  [] Speech Therapy  [] Occupational therapy  *PT and Speech caps combine      $2010 Cap limit < kx modifier needed < $0568 requires pre-cert        Patient Name: Guerline Ibanez  YOB: 1940    Note:  This is an estimate of charges billed.      Date of [address] Name # units/ charge $$$ charge Daily Total Charge Ongoing Total $$$   11/1/18 EVAL+TE+GAIT 1+1+1 $80.57+29.49+22.83 132.89 $132.89   11/6/18 therex 3 28.58+04.56*7 75.47 $208.36   11/8/18 therex 3 63.71+56.68*0 75.47 283.86   11/13/18 therex 3 89.49+86.29*0 75.47 359.33   11/15/18 2TE+vaso 2+1 29.49+22.99+12.54 65.02 $424.35   11/20/18 Therex, vaso 2,1 58.70+25.13+ 12.54 65.02 489.37   11/27/18 3TE, vaso 3+1 29.49+22.99*2+12.54 88.01 577.38   11/29/18 3 TE, vaso 3=1 25.04+03.40+ 22.99+12.54 88.01 665.39   12/4/18 2 TE, vaso 2+1 29.49+22.99+ 12.54 65.02 730.41   12/6/18 2TE+gait 3 29.49*2+22.83 68.81 799.22   12/11/18 3TE, vaso 4 29.49+22.99*2+12.54 88.01 887.23   12/13/18 3TE 3 29.49+22.99*2 75.47 962.70   12/18/19 3 TE 3 29.49+22.99*2 75.47 1038.17   12/20/18 4 TE 4 29.49+22.99*3 98.46 1136.63   12/24/18 3 TE 3 29.49+22.99*2 75.47 1212.10

## 2018-12-27 ENCOUNTER — HOSPITAL ENCOUNTER (OUTPATIENT)
Dept: PHYSICAL THERAPY | Facility: CLINIC | Age: 78
Setting detail: THERAPIES SERIES
Discharge: HOME | End: 2018-12-27
Payer: MEDICARE

## 2018-12-27 PROCEDURE — 97110 THERAPEUTIC EXERCISES: CPT

## 2018-12-27 NOTE — PROGRESS NOTES
hip strength to 5/5 within 8 weeks for improved ability to negotiate uneven terrain and ambulate in community without abnormality. 2. Patient to demonstrate improved knee AROM to >110 flexion, neutral extension, >110 hip flexion, >15 hip extension within 8 weeks for symmetry bilaterally. 3. Patient to demonstrate improved LEFS score to >60/80 within 8 weeks for improved function.                    Patient goals: Decrease pain and improve mobility    Pt. Education:  [x] Yes  [] No  [x] Reviewed Prior HEP/Ed  Method of Education: [x] Verbal  [] Demo  [x] Written: copy in chart  Comprehension of Education:  [x] Verbalizes understanding. [x] Demonstrates understanding. [] Needs review. [] Demonstrates/verbalizes HEP/Ed previously given. Plan: [x] Continue per plan of care.    [] Other:      Time In: 11:00 am         Time Out: 11:50 am    Electronically signed by:  Arturo Foreman, PT

## 2018-12-31 ENCOUNTER — HOSPITAL ENCOUNTER (OUTPATIENT)
Dept: PHYSICAL THERAPY | Facility: CLINIC | Age: 78
Setting detail: THERAPIES SERIES
Discharge: HOME | End: 2018-12-31
Payer: MEDICARE

## 2018-12-31 PROCEDURE — 97016 VASOPNEUMATIC DEVICE THERAPY: CPT

## 2018-12-31 PROCEDURE — 97110 THERAPEUTIC EXERCISES: CPT

## 2019-01-02 ENCOUNTER — HOSPITAL ENCOUNTER (OUTPATIENT)
Dept: PHYSICAL THERAPY | Facility: CLINIC | Age: 79
Setting detail: THERAPIES SERIES
Discharge: HOME | End: 2019-01-02
Payer: MEDICARE

## 2019-01-02 PROCEDURE — 97110 THERAPEUTIC EXERCISES: CPT

## 2019-01-07 ENCOUNTER — HOSPITAL ENCOUNTER (OUTPATIENT)
Dept: PHYSICAL THERAPY | Facility: CLINIC | Age: 79
Setting detail: THERAPIES SERIES
Discharge: HOME | End: 2019-01-07
Payer: MEDICARE

## 2019-01-07 PROCEDURE — 97110 THERAPEUTIC EXERCISES: CPT

## 2019-01-10 ENCOUNTER — HOSPITAL ENCOUNTER (OUTPATIENT)
Dept: PHYSICAL THERAPY | Facility: CLINIC | Age: 79
Setting detail: THERAPIES SERIES
Discharge: HOME | End: 2019-01-10
Payer: MEDICARE

## 2019-01-10 PROCEDURE — 97110 THERAPEUTIC EXERCISES: CPT

## 2019-01-16 ENCOUNTER — HOSPITAL ENCOUNTER (OUTPATIENT)
Dept: PHYSICAL THERAPY | Facility: CLINIC | Age: 79
Setting detail: THERAPIES SERIES
Discharge: HOME | End: 2019-01-16
Payer: MEDICARE

## 2019-01-16 PROCEDURE — 97110 THERAPEUTIC EXERCISES: CPT

## 2019-01-23 ENCOUNTER — HOSPITAL ENCOUNTER (OUTPATIENT)
Dept: PHYSICAL THERAPY | Facility: CLINIC | Age: 79
Setting detail: THERAPIES SERIES
Discharge: HOME | End: 2019-01-23
Payer: MEDICARE

## 2019-01-23 PROCEDURE — 97110 THERAPEUTIC EXERCISES: CPT

## 2024-03-26 ENCOUNTER — OFFICE VISIT (OUTPATIENT)
Age: 84
End: 2024-03-26
Payer: MEDICARE

## 2024-03-26 VITALS — HEIGHT: 69 IN | WEIGHT: 165 LBS | BODY MASS INDEX: 24.44 KG/M2

## 2024-03-26 DIAGNOSIS — M25.552 LEFT HIP PAIN: Primary | ICD-10-CM

## 2024-03-26 PROCEDURE — G8420 CALC BMI NORM PARAMETERS: HCPCS | Performed by: ORTHOPAEDIC SURGERY

## 2024-03-26 PROCEDURE — G8484 FLU IMMUNIZE NO ADMIN: HCPCS | Performed by: ORTHOPAEDIC SURGERY

## 2024-03-26 PROCEDURE — G8427 DOCREV CUR MEDS BY ELIG CLIN: HCPCS | Performed by: ORTHOPAEDIC SURGERY

## 2024-03-26 PROCEDURE — 1123F ACP DISCUSS/DSCN MKR DOCD: CPT | Performed by: ORTHOPAEDIC SURGERY

## 2024-03-26 PROCEDURE — 99214 OFFICE O/P EST MOD 30 MIN: CPT | Performed by: ORTHOPAEDIC SURGERY

## 2024-03-26 PROCEDURE — 1036F TOBACCO NON-USER: CPT | Performed by: ORTHOPAEDIC SURGERY

## 2024-03-26 NOTE — PROGRESS NOTES
[facility]      [facility] PHYSICIANS [facility], [facility]  [facility]IC[facility]  [address]  [place] [address]  Dept: [phone]  Dept Fax: [phone]    Chief Compliant:  Chief Complaint   Patient presents with    Leg Pain     Left leg        History of Present Illness:  This is a 83 y.o. male who presents to the clinic today for evaluation / follow up of left leg pain.  He states he has had foot drop on the side for some time.  He is never had an MRI that he can recollect however he has had x-rays of the lumbar spine.  He states that these were unremarkable.  He is here today with pain going down the left leg in the L5 distribution.  He has pain in the buttock area and lower back no pain in the groin..       Physical Exam:    On examination today he has a foot drop on the left side.  He tolerates hip range of motion very well with no discomfort no pain.  No tenderness over the greater trochanteric bursa area.  He has some paralumbar spine muscular tenderness.  No hip instability.    Nursing note and vitals reviewed.     Labs and Imaging:     XR taken today:  XR HIP LEFT (2-3 VIEWS)    Result Date: 3/26/2024  X-ray left hip shows no acute process.  The joint space looks to be well-preserved.  No evidence of AVN           Orders Placed This Encounter   Procedures    XR HIP LEFT (2-3 VIEWS)    MRI LUMBAR SPINE WO CONTRAST     Standing Status:   Future     Standing Expiration Date:   9/26/2024     Order Specific Question:   Reason for exam:     Answer:   Evaluate for L foot drop    Ambulatory referral to Neurosurgery     Referral Priority:   Routine     Referral Type:   Eval and Treat     Referral Reason:   Specialty Services Required     Requested Specialty:   Neurosurgery     Number of Visits Requested:   1       Assessment and Plan:  1. Left hip pain          This is a 83 y.o. male with left leg radiculopathy and foot drop.

## 2024-04-12 ENCOUNTER — HOSPITAL ENCOUNTER (OUTPATIENT)
Dept: MRI IMAGING | Age: 84
End: 2024-04-12
Attending: ORTHOPAEDIC SURGERY
Payer: MEDICARE

## 2024-04-12 DIAGNOSIS — M25.552 LEFT HIP PAIN: ICD-10-CM

## 2024-04-12 PROCEDURE — 72148 MRI LUMBAR SPINE W/O DYE: CPT

## 2024-05-13 ENCOUNTER — OFFICE VISIT (OUTPATIENT)
Age: 84
End: 2024-05-13
Payer: MEDICARE

## 2024-05-13 VITALS
HEART RATE: 94 BPM | SYSTOLIC BLOOD PRESSURE: 132 MMHG | WEIGHT: 159.9 LBS | DIASTOLIC BLOOD PRESSURE: 69 MMHG | HEIGHT: 68 IN | BODY MASS INDEX: 24.23 KG/M2

## 2024-05-13 DIAGNOSIS — M21.372 LEFT FOOT DROP: ICD-10-CM

## 2024-05-13 DIAGNOSIS — M48.061 FORAMINAL STENOSIS OF LUMBAR REGION: ICD-10-CM

## 2024-05-13 DIAGNOSIS — M54.16 LUMBAR RADICULOPATHY: Primary | ICD-10-CM

## 2024-05-13 PROCEDURE — 1036F TOBACCO NON-USER: CPT | Performed by: NEUROLOGICAL SURGERY

## 2024-05-13 PROCEDURE — 1123F ACP DISCUSS/DSCN MKR DOCD: CPT | Performed by: NEUROLOGICAL SURGERY

## 2024-05-13 PROCEDURE — 99204 OFFICE O/P NEW MOD 45 MIN: CPT | Performed by: NEUROLOGICAL SURGERY

## 2024-05-13 PROCEDURE — G8420 CALC BMI NORM PARAMETERS: HCPCS | Performed by: NEUROLOGICAL SURGERY

## 2024-05-13 PROCEDURE — G8427 DOCREV CUR MEDS BY ELIG CLIN: HCPCS | Performed by: NEUROLOGICAL SURGERY

## 2024-05-13 RX ORDER — FINASTERIDE 5 MG/1
1 TABLET, FILM COATED ORAL DAILY
COMMUNITY
Start: 2024-02-26

## 2024-05-13 RX ORDER — TEMAZEPAM 15 MG/1
CAPSULE ORAL
COMMUNITY

## 2024-05-13 RX ORDER — AMLODIPINE BESYLATE 10 MG/1
1 TABLET ORAL DAILY
COMMUNITY
Start: 2022-03-07

## 2024-05-13 RX ORDER — ASPIRIN 325 MG
325 TABLET, DELAYED RELEASE (ENTERIC COATED) ORAL
COMMUNITY
Start: 2018-09-06

## 2024-05-13 RX ORDER — LORATADINE 10 MG/1
TABLET ORAL
COMMUNITY

## 2024-05-13 RX ORDER — ASPIRIN 81 MG/1
TABLET ORAL
COMMUNITY

## 2024-05-13 RX ORDER — ASPIRIN 81 MG
TABLET, DELAYED RELEASE (ENTERIC COATED) ORAL
COMMUNITY
Start: 2016-06-17

## 2024-05-13 RX ORDER — LANOLIN ALCOHOL/MO/W.PET/CERES
CREAM (GRAM) TOPICAL
COMMUNITY

## 2024-05-13 ASSESSMENT — ENCOUNTER SYMPTOMS
VOMITING: 0
NAUSEA: 0
SHORTNESS OF BREATH: 0
COUGH: 0
ABDOMINAL PAIN: 0
BACK PAIN: 0
WHEEZING: 0
CONSTIPATION: 0

## 2024-05-13 NOTE — PROGRESS NOTES
Review of Systems   Constitutional:  Negative for chills, fatigue, fever and unexpected weight change.   HENT:  Positive for hearing loss.         Blurry vision   Respiratory:  Negative for cough, shortness of breath and wheezing.    Cardiovascular:  Negative for chest pain and palpitations.        Difficulty laying flat   Gastrointestinal:  Negative for abdominal pain, constipation, nausea and vomiting.        Bowel incont.   Musculoskeletal:  Positive for gait problem. Negative for back pain, joint swelling, neck pain and neck stiffness.        Joint stiffness   Neurological:  Negative for dizziness, numbness and headaches.   Psychiatric/Behavioral:  Positive for sleep disturbance.

## 2024-05-13 NOTE — PROGRESS NOTES
[facility] NEUROSCIENCE INSTITUTE, [facility] NEUROSURGERY  [address], [address]  [place] [address]  Dept: [phone]  Dept Fax: [phone]     Patient:  Jan Ventura  YOB: 1940  Date: 5/13/24      Chief Complaint   Patient presents with    New Patient     Left hip pain, lumbar stenosis           HPI:     I had the pleasure of seeing this patient in the office today for primary complaints of left low back and left lower extremity discomfort.  As you know the patient is a 83-year-old male who presents with at least a several year history of ongoing discomfort.  He describes pain originating in the left low back and left hip coursing in the posterior lateral thigh and calf region.  Additionally he has noticed ongoing left foot drop.  The patient describes no discomfort coursing into the right lower extremity.  The patient has been evaluated by orthopedics and no primary hip disorder was identified.  The patient describes no difficulty with his bowel or bladder function.  He describes no symptoms of the upper extremities.        History:     Past Medical History:   Diagnosis Date    Back pain     Cataract     Diarrhea     Hypertension      Past Surgical History:   Procedure Laterality Date    KNEE SURGERY       No family history on file.  Current Outpatient Medications on File Prior to Visit   Medication Sig Dispense Refill    amLODIPine (NORVASC) 10 MG tablet Take 1 tablet by mouth daily      finasteride (PROSCAR) 5 MG tablet Take 1 tablet by mouth daily      hydrochlorothiazide (MICROZIDE) 12.5 MG capsule Take 1 capsule by mouth daily      irbesartan (AVAPRO) 300 MG tablet Take 1 tablet by mouth nightly      aspirin 81 MG EC tablet 1 tab 3 times weekly (Patient not taking: Reported on 5/13/2024)      aspirin 325 MG EC tablet Take 1 tablet by mouth (Patient not taking: Reported on 5/13/2024)      carbamide peroxide (DEBROX) 6.5 % otic

## 2024-05-20 ENCOUNTER — HOSPITAL ENCOUNTER (OUTPATIENT)
Dept: CT IMAGING | Age: 84
Discharge: HOME | End: 2024-05-22
Payer: MEDICARE

## 2024-05-20 ENCOUNTER — HOSPITAL ENCOUNTER (OUTPATIENT)
Dept: GENERAL RADIOLOGY | Age: 84
Discharge: HOME | End: 2024-05-22
Payer: MEDICARE

## 2024-05-20 ENCOUNTER — HOSPITAL ENCOUNTER (OUTPATIENT)
Age: 84
Discharge: HOME | End: 2024-05-20

## 2024-05-20 VITALS
TEMPERATURE: 98 F | DIASTOLIC BLOOD PRESSURE: 70 MMHG | SYSTOLIC BLOOD PRESSURE: 151 MMHG | HEART RATE: 99 BPM | RESPIRATION RATE: 16 BRPM | OXYGEN SATURATION: 91 %

## 2024-05-20 DIAGNOSIS — M48.061 FORAMINAL STENOSIS OF LUMBAR REGION: ICD-10-CM

## 2024-05-20 LAB
INR PPP: 1
PARTIAL THROMBOPLASTIN TIME: 26.7 SEC (ref 26.8–34.8)
PROTHROMBIN TIME: 12.8 SEC (ref 11.7–14.1)

## 2024-05-20 PROCEDURE — 85730 THROMBOPLASTIN TIME PARTIAL: CPT

## 2024-05-20 PROCEDURE — 72132 CT LUMBAR SPINE W/DYE: CPT

## 2024-05-20 PROCEDURE — 2709999900 FL MYELOGRAM LUMBOSACRAL S&I

## 2024-05-20 PROCEDURE — 6360000004 HC RX CONTRAST MEDICATION: Performed by: NEUROLOGICAL SURGERY

## 2024-05-20 PROCEDURE — 36415 COLL VENOUS BLD VENIPUNCTURE: CPT

## 2024-05-20 PROCEDURE — 85610 PROTHROMBIN TIME: CPT

## 2024-05-20 PROCEDURE — 2500000003 HC RX 250 WO HCPCS: Performed by: RADIOLOGY

## 2024-05-20 RX ORDER — IOPAMIDOL 612 MG/ML
10 INJECTION, SOLUTION INTRATHECAL
Status: COMPLETED | OUTPATIENT
Start: 2024-05-20 | End: 2024-05-20

## 2024-05-20 RX ORDER — LIDOCAINE HYDROCHLORIDE 10 MG/ML
INJECTION, SOLUTION EPIDURAL; INFILTRATION; INTRACAUDAL; PERINEURAL PRN
Status: COMPLETED | OUTPATIENT
Start: 2024-05-20 | End: 2024-05-20

## 2024-05-20 RX ADMIN — IOPAMIDOL 10 ML: 612 INJECTION, SOLUTION INTRATHECAL at 12:32

## 2024-05-20 RX ADMIN — LIDOCAINE HYDROCHLORIDE 1 ML: 10 INJECTION, SOLUTION EPIDURAL; INFILTRATION; INTRACAUDAL; PERINEURAL at 12:15

## 2024-05-20 NOTE — DISCHARGE INSTRUCTIONS
Lumbar Puncture: What to Expect at Home  Your Recovery     A lumbar puncture (also called a spinal tap) is a test to check the fluid that surrounds and protects your spinal cord and brain. Your doctor may have done this test to look for an infection. In some cases, a lumbar puncture is done to release pressure from too much fluid or to look for diseases such as multiple sclerosis.  You may feel tired, and your back may be sore where the needle went in (the puncture site). You may have a mild headache for a day or two. This can happen when some of the spinal fluid is removed. Some people also have trouble sleeping for a day or two.  The fluid taken during a lumbar puncture is often sent to a lab for tests. Your doctor or nurse will call you with the test results.  This care sheet gives you a general idea about how long it will take for you to recover. But each person recovers at a different pace. Follow the steps below to get better as quickly as possible.  How can you care for yourself at home?  Activity  Your doctor may recommend that you lie down at home the rest of the day after procedure. This may prevent a headache.  Rest when you feel tired. Getting enough sleep will help you recover.  Ask your doctor when you can drive again.  Diet  Drink extra fluids after the procedure to help prevent a headache or make it less severe.  You may be instructed to drink caffeine to help ease headache after procedure.  Medicines  If you take blood thinners, such as warfarin (Coumadin), clopidogrel (Plavix), or aspirin, be sure to talk to your doctor. He or she will tell you if and when to start taking those medicines again. Make sure that you understand exactly what your doctor wants you to do.  If you have pain, take pain medicines exactly as directed.  If the doctor gave you a prescription medicine for pain, take it as prescribed.  If you are not taking a prescription pain medicine, ask your doctor if you can take an

## 2024-05-20 NOTE — PROGRESS NOTES
Adjusted HOB to 30 degrees, denies complaint.  Assisted to sit at edge of cart, denies complaint.  Dressed for home.  Taken to private car driven by spouse, all belongings sent with patient.

## 2024-05-20 NOTE — PROGRESS NOTES
Received in recovery per cart.  Denies complaint.  Patient lying supine, talking with wife.  Vital signs stable.

## 2024-07-09 DIAGNOSIS — M21.372 LEFT FOOT DROP: ICD-10-CM

## 2024-07-10 ENCOUNTER — OFFICE VISIT (OUTPATIENT)
Age: 84
End: 2024-07-10
Payer: MEDICARE

## 2024-07-10 VITALS
BODY MASS INDEX: 24.1 KG/M2 | HEART RATE: 84 BPM | SYSTOLIC BLOOD PRESSURE: 121 MMHG | RESPIRATION RATE: 16 BRPM | WEIGHT: 159 LBS | HEIGHT: 68 IN | DIASTOLIC BLOOD PRESSURE: 68 MMHG

## 2024-07-10 DIAGNOSIS — M21.372 LEFT FOOT DROP: ICD-10-CM

## 2024-07-10 DIAGNOSIS — M54.16 LUMBAR RADICULOPATHY: Primary | ICD-10-CM

## 2024-07-10 DIAGNOSIS — M54.50 LUMBAR PAIN: ICD-10-CM

## 2024-07-10 PROCEDURE — G8427 DOCREV CUR MEDS BY ELIG CLIN: HCPCS | Performed by: PHYSICIAN ASSISTANT

## 2024-07-10 PROCEDURE — 99214 OFFICE O/P EST MOD 30 MIN: CPT | Performed by: PHYSICIAN ASSISTANT

## 2024-07-10 PROCEDURE — G8420 CALC BMI NORM PARAMETERS: HCPCS | Performed by: PHYSICIAN ASSISTANT

## 2024-07-10 PROCEDURE — 1123F ACP DISCUSS/DSCN MKR DOCD: CPT | Performed by: PHYSICIAN ASSISTANT

## 2024-07-10 PROCEDURE — 1036F TOBACCO NON-USER: CPT | Performed by: PHYSICIAN ASSISTANT

## 2024-07-10 NOTE — PROGRESS NOTES
[facility], [facility] NEUROSCIENCE INSTITUTE, [facility] NEUROSURGERY  [address], [address]  [place] [facility] [place] [address]  Dept: [phone]  Dept Fax: [phone]     Patient:  Jan Ventura  YOB: 1940  Date: 7/10/24      Chief Complaint   Patient presents with    Follow-up       f/u after Lumbosacral CT Myelogram 5/20/24 @ [facility] / EMG 5/22/24 @ [facility]              HPI:     I had the pleasure of seeing this patient in the office today in follow-up.  As you know he is a 83-year-old male who was last seen in our office in May for ongoing complaints of left lower extremity pain coursing in L5 nerve root distribution as well as ongoing left foot drop.  MRI of the lumbar spine demonstrated moderate central stenosis at the L2-3 level along with left-sided foraminal stenosis at the L2-3 and L4-5 level suspicious for nerve root impingement therefore did proceed with imaging form of a lumbar myelogram CT as well as a left lower extremity EMG nerve conduction study.  Patient presents today stating his left lower extremity pain has improved completely.  He does continue to report ongoing lower back pain along with left foot drop which she feels has may be improved slightly.  He feels his left foot drop has been present for approximately 1 year.  EMG nerve conduction study demonstrates a subacute left L5-S1 radiculopathy, no signs of neuropathy according to dictated report.  Lumbar myelogram CT is reviewed in the office today.  I demonstrated the radiographic findings with him.  This study demonstrates mild to moderate stenosis at the L2-3 and L3-4 level.  He does have a broad-based disc bulging at the L4-5 level however no clear evidence of nerve root impingement is identified.  Degenerative changes with loss of disc base height noted throughout the lumbar spine.  In reviewing these images, I do not clearly identify pinched nerve on the left to which we feel

## 2024-07-15 ENCOUNTER — HOSPITAL ENCOUNTER (OUTPATIENT)
Dept: PHYSICAL THERAPY | Facility: CLINIC | Age: 84
Setting detail: THERAPIES SERIES
Discharge: HOME | End: 2024-07-15
Payer: MEDICARE

## 2024-07-15 PROCEDURE — 97161 PT EVAL LOW COMPLEX 20 MIN: CPT

## 2024-07-15 NOTE — CONSULTS
[] [facility]  Outpatient Rehabilitation &  Therapy  [address]  [phone]  [phone] [] [facility]  Outpatient Rehabilitation &  Therapy  [address]  P: [phone]  F: [phone] [x] [facility]  Outpatient Rehabilitation &  Therapy  [address]  P: [phone]  F: [phone] [] [facility]  Outpatient Rehabilitation &  Therapy  [address]  [phone]  [phone] [] [facility]  Outpatient Rehabilitation &  Therapy  [address]   P: [phone]  F: [phone]  [] [facility]  Outpatient Rehabilitation &  Therapy  [address]  P: [phone]  F: [phone] [] [facility]  Outpatient Rehabilitation &  Therapy  [address]  P: [phone]  F: [phone] [] [facility]  Outpatient Rehabilitation &  Therapy  [address]  P: [phone]  F: [phone] [] [facility]  Outpatient Rehabilitation &  Therapy  [address]  P: [phone]  F: [phone]  [] [facility] Outpatient Rehabilitation &  Therapy  [address]  P: [phone]  F: [phone]     Physical Therapy Spine Evaluation    Date:  7/15/2024  Patient: Jan Ventura  : 1940  MRN: [id number]  Physician: Gaviota Hector PA     Insurance: MEDICARE-visits bmn no hard max no auth required MEDICAL MUTUAL MEDICARE SUPP- Follow Medicare guidelines no auth required  Medical Diagnosis: M21.372 (ICD-10-CM) - Left foot drop  M54.50 (ICD-10-CM) - Lumbar pain  Rehab Codes: M62.81, R26.2, M54.5  Onset Date: 3/1/24  Next 's appt.: 24    Subjective:   CC: Patient reports continuous LBP that varies in intensity. Pain is worse with bending over, doing gardening 3/10  Pain is relieved by off loading through pulling himself up

## 2024-07-18 ENCOUNTER — HOSPITAL ENCOUNTER (OUTPATIENT)
Dept: PHYSICAL THERAPY | Facility: CLINIC | Age: 84
Setting detail: THERAPIES SERIES
Discharge: HOME | End: 2024-07-18
Payer: MEDICARE

## 2024-07-18 PROCEDURE — 97110 THERAPEUTIC EXERCISES: CPT

## 2024-07-18 NOTE — FLOWSHEET NOTE
[] [facility]  Outpatient Rehabilitation &  Therapy  [address]  [phone]  [phone] [] [facility]  Outpatient Rehabilitation &  Therapy  [address]  P: [phone]  F: [phone] [x] [facility]  Outpatient Rehabilitation &  Therapy  [address]  P: [phone]  F: [phone] [] [facility]  Outpatient Rehabilitation &  Therapy  [address]  [phone]  [phone] [] [facility]  Outpatient Rehabilitation &  Therapy  [address]   P: [phone]  F: [phone]  [] [facility]  Outpatient Rehabilitation &  Therapy  [address]  P: [phone]  F: [phone] [] [facility]  Outpatient Rehabilitation &  Therapy  [address]  P: [phone]  F: [phone] [] [facility]  Outpatient Rehabilitation &  Therapy  [address]  P: [phone]  F: [phone] [] [facility]  Outpatient Rehabilitation &  Therapy  [address]  P: [phone]  F: [phone]  [] [facility] Outpatient Rehabilitation &  Therapy  [address]  P: [phone]  F: [phone]     Physical Therapy Daily Treatment Note    Date:  2024  Patient Name:  Jan Ventura    :  1940  MRN: [id number]  Physician: Gaviota Hector PA                               Insurance: MEDICARE-visits bmn no hard max no auth required MEDICAL MUTUAL MEDICARE SUPP- Follow Medicare guidelines no auth required  Medical Diagnosis: M21.372 (ICD-10-CM) - Left foot drop  M54.50 (ICD-10-CM) - Lumbar pain  Rehab Codes: M62.81, R26.2, M54.5  Onset Date: 3/1/24                 Next 's appt.: 24    Visit# / total visits: 2/ (1-2x wk)           Progress note for Medicare patient due at visit 10     Cancels/No Shows:

## 2024-07-22 ENCOUNTER — HOSPITAL ENCOUNTER (OUTPATIENT)
Dept: PHYSICAL THERAPY | Facility: CLINIC | Age: 84
Setting detail: THERAPIES SERIES
Discharge: HOME | End: 2024-07-22
Payer: MEDICARE

## 2024-07-22 PROCEDURE — 97110 THERAPEUTIC EXERCISES: CPT

## 2024-07-22 NOTE — FLOWSHEET NOTE
[] [facility]  Outpatient Rehabilitation &  Therapy  [address]  [phone]  [phone] [] [facility]  Outpatient Rehabilitation &  Therapy  [address]  P: [phone]  F: [phone] [x] [facility]  Outpatient Rehabilitation &  Therapy  [address]  P: [phone]  F: [phone] [] [facility]  Outpatient Rehabilitation &  Therapy  [address]  [phone]  [phone] [] [facility]  Outpatient Rehabilitation &  Therapy  [address]   P: [phone]  F: [phone]  [] [facility]  Outpatient Rehabilitation &  Therapy  [address]  P: [phone]  F: [phone] [] [facility]  Outpatient Rehabilitation &  Therapy  [address]  P: [phone]  F: [phone] [] [facility]  Outpatient Rehabilitation &  Therapy  [address]  P: [phone]  F: [phone] [] [facility]  Outpatient Rehabilitation &  Therapy  [address]  P: [phone]  F: [phone]  [] [facility] Outpatient Rehabilitation &  Therapy  [address]  P: [phone]  F: [phone]     Physical Therapy Daily Treatment Note    Date:  2024  Patient Name:  Jan Ventura    :  1940  MRN: [id number]  Physician: Gaviota Hector PA                               Insurance: MEDICARE-visits bmn no hard max no auth required MEDICAL M[employer]UAL MEDICARE SUPP- Follow Medicare guidelines no auth required  Medical Diagnosis: M21.372 (ICD-10-CM) - Left foot drop  M54.50 (ICD-10-CM) - Lumbar pain  Rehab Codes: M62.81, R26.2, M54.5  Onset Date: 3/1/24                 Next 's appt.: 24    Visit# / total visits: 3/16 (1-2x wk)           Progress note for Medicare patient due at visit 10     Cancels/No Shows:

## 2024-07-24 ENCOUNTER — HOSPITAL ENCOUNTER (OUTPATIENT)
Dept: PHYSICAL THERAPY | Facility: CLINIC | Age: 84
Setting detail: THERAPIES SERIES
Discharge: HOME | End: 2024-07-24
Payer: MEDICARE

## 2024-07-24 PROCEDURE — 97110 THERAPEUTIC EXERCISES: CPT

## 2024-07-24 NOTE — FLOWSHEET NOTE
Other:  [x] Eval: Patient presents to physical therapy reporting LBP and L foot drop. He demonstrates  limited bilateral hip rotation, weakness in bilateral glutes   limited thoracic mobility and gait deviations due to L foot drop. Patient would benefit from skilled physical therapy services in order to improve standing stability , reduce pain and improve functional mobility.     STG: (to be met in 8 treatments)  ? Pain: Patient to report 0/10 LBP  ? ROM:  ? Strength:   ? Function:  Patient to be independent with home exercise program as demonstrated by performance with correct form without cues.  Demonstrate Knowledge of fall prevention  LTG: (to be met in 16 treatments)  Patient to demonstrate 3+/5 L DF and 4/5 bilateral hip abduction  Patient to demonstrate the ability to single leg balance x 10sec bilateral to reduce fall risk  Patient to demonstrate the ability to ambulate without path deviation and pelvic stability  Patient to demonstrate the ability to complete SB motions without pain     Patient goals:to reduce foot drop, decrease pain and improve balance    Pt. Education:  [x] Yes  [] No  [] Reviewed Prior HEP/Ed  Method of Education: [x] Verbal  [x] Demo  [x] Written  Comprehension of Education:  [x] Verbalizes understanding.  [x] Demonstrates understanding.  [] Needs review.  [] Demonstrates/verbalizes HEP/Ed previously given.     Access Code: [email or web address]  URL: [email or web address]  Date: 07/24/2024  Prepared by: Haley Lopez    Exercises  - Hooklying Single Knee to Chest Stretch  - 2 x daily - 7 x weekly - 1 sets - 1 reps - 60sec hold  - Sidelying Hip Abduction at Wall  - 2 x daily - 7 x weekly - 2 sets - 12 reps  - Seated Heel Toe Raises  - 1 x daily - 7 x weekly - 2 sets - 10 reps - 5 hold  - Hooklying Clamshell with Resistance  - 1 x daily - 7 x weekly - 2 sets - 10 reps - 4 hold  - Bent Knee Fallouts with Alternating Legs  - 1 x daily - 7 x weekly - 2 sets - 10 reps  - Supine Transversus

## 2024-07-29 ENCOUNTER — HOSPITAL ENCOUNTER (OUTPATIENT)
Dept: PHYSICAL THERAPY | Facility: CLINIC | Age: 84
Setting detail: THERAPIES SERIES
Discharge: HOME | End: 2024-07-29
Payer: MEDICARE

## 2024-07-29 PROCEDURE — 97110 THERAPEUTIC EXERCISES: CPT

## 2024-07-29 NOTE — FLOWSHEET NOTE
Other:  [x] Eval: Patient presents to physical therapy reporting LBP and L foot drop. He demonstrates  limited bilateral hip rotation, weakness in bilateral glutes   limited thoracic mobility and gait deviations due to L foot drop. Patient would benefit from skilled physical therapy services in order to improve standing stability , reduce pain and improve functional mobility.     STG: (to be met in 8 treatments)  ? Pain: Patient to report 0/10 LBP  ? ROM:  ? Strength:   ? Function:  Patient to be independent with home exercise program as demonstrated by performance with correct form without cues.  Demonstrate Knowledge of fall prevention  LTG: (to be met in 16 treatments)  Patient to demonstrate 3+/5 L DF and 4/5 bilateral hip abduction  Patient to demonstrate the ability to single leg balance x 10sec bilateral to reduce fall risk  Patient to demonstrate the ability to ambulate without path deviation and pelvic stability  Patient to demonstrate the ability to complete SB motions without pain     Patient goals:to reduce foot drop, decrease pain and improve balance    Pt. Education:  [x] Yes  [] No  [] Reviewed Prior HEP/Ed  Method of Education: [x] Verbal  [x] Demo  [x] Written  Comprehension of Education:  [x] Verbalizes understanding.  [x] Demonstrates understanding.  [] Needs review.  [] Demonstrates/verbalizes HEP/Ed previously given.     Access Code: [email or web address]  URL: [email or web address]  Date: 07/24/2024  Prepared by: Haley Lopez    Exercises  - Hooklying Single Knee to Chest Stretch  - 2 x daily - 7 x weekly - 1 sets - 1 reps - 60sec hold  - Sidelying Hip Abduction at Wall  - 2 x daily - 7 x weekly - 2 sets - 12 reps  - Seated Heel Toe Raises  - 1 x daily - 7 x weekly - 2 sets - 10 reps - 5 hold  - Hooklying Clamshell with Resistance  - 1 x daily - 7 x weekly - 2 sets - 10 reps - 4 hold  - Bent Knee Fallouts with Alternating Legs  - 1 x daily - 7 x weekly - 2 sets - 10 reps  - Supine Transversus

## 2024-07-31 ENCOUNTER — HOSPITAL ENCOUNTER (OUTPATIENT)
Dept: PHYSICAL THERAPY | Facility: CLINIC | Age: 84
Setting detail: THERAPIES SERIES
Discharge: HOME | End: 2024-07-31
Payer: MEDICARE

## 2024-07-31 PROCEDURE — 97110 THERAPEUTIC EXERCISES: CPT

## 2024-07-31 NOTE — FLOWSHEET NOTE
[] [facility]  Outpatient Rehabilitation &  Therapy  [address]  [phone]  [phone] [] [facility]  Outpatient Rehabilitation &  Therapy  [address]  P: [phone]  F: [phone] [x] [facility]  Outpatient Rehabilitation &  Therapy  [address]  P: [phone]  F: [phone] [] [facility]  Outpatient Rehabilitation &  Therapy  [address]  [phone]  [phone] [] [facility]  Outpatient Rehabilitation &  Therapy  [address]   P: [phone]  F: [phone]  [] [facility]  Outpatient Rehabilitation &  Therapy  [address]  P: [phone]  F: [phone] [] [facility]  Outpatient Rehabilitation &  Therapy  [address]  P: [phone]  F: [phone] [] [facility]  Outpatient Rehabilitation &  Therapy  [address]  P: [phone]  F: [phone] [] [facility]  Outpatient Rehabilitation &  Therapy  [address]  P: [phone]  F: [phone]  [] [facility] Outpatient Rehabilitation &  Therapy  [address]  P: [phone]  F: [phone]     Physical Therapy Daily Treatment Note    Date:  2024  Patient Name:  Jan Ventura    :  1940  MRN: [id number]  Physician: Gaviota Hector PA                               Insurance: MEDICARE-visits bmn no hard max no auth required MEDICAL M[employer] MEDICARE SUPP- Follow Medicare guidelines no auth required  Medical Diagnosis: M21.372 (ICD-10-CM) - Left foot drop  M54.50 (ICD-10-CM) - Lumbar pain  Rehab Codes: M62.81, R26.2, M54.5  Onset Date: 3/1/24                 Next 's appt.: 24    Visit# / total visits:  (1-2x wk)           Progress note for Medicare patient due at visit 10     Cancels/No Shows:

## 2024-08-05 ENCOUNTER — HOSPITAL ENCOUNTER (OUTPATIENT)
Dept: PHYSICAL THERAPY | Facility: CLINIC | Age: 84
Setting detail: THERAPIES SERIES
Discharge: HOME | End: 2024-08-05
Payer: MEDICARE

## 2024-08-05 PROCEDURE — 97110 THERAPEUTIC EXERCISES: CPT

## 2024-08-05 NOTE — FLOWSHEET NOTE
presents to physical therapy reporting LBP and L foot drop. He demonstrates  limited bilateral hip rotation, weakness in bilateral glutes   limited thoracic mobility and gait deviations due to L foot drop. Patient would benefit from skilled physical therapy services in order to improve standing stability , reduce pain and improve functional mobility.     STG: (to be met in 8 treatments)  ? Pain: Patient to report 0/10 LBP  ? ROM:  ? Strength:   ? Function:  Patient to be independent with home exercise program as demonstrated by performance with correct form without cues.  Demonstrate Knowledge of fall prevention  LTG: (to be met in 16 treatments)  Patient to demonstrate 3+/5 L DF and 4/5 bilateral hip abduction  Patient to demonstrate the ability to single leg balance x 10sec bilateral to reduce fall risk  Patient to demonstrate the ability to ambulate without path deviation and pelvic stability  Patient to demonstrate the ability to complete SB motions without pain     Patient goals:to reduce foot drop, decrease pain and improve balance    Pt. Education:  [x] Yes  [] No  [] Reviewed Prior HEP/Ed  Method of Education: [x] Verbal  [x] Demo  [x] Written  Comprehension of Education:  [x] Verbalizes understanding.  [x] Demonstrates understanding.  [] Needs review.  [] Demonstrates/verbalizes HEP/Ed previously given.     Access Code: [email or web address]  URL: [email or web address]  Date: 07/24/2024  Prepared by: Haley Lopez    Exercises  - Hooklying Single Knee to Chest Stretch  - 2 x daily - 7 x weekly - 1 sets - 1 reps - 60sec hold  - Sidelying Hip Abduction at Wall  - 2 x daily - 7 x weekly - 2 sets - 12 reps  - Seated Heel Toe Raises  - 1 x daily - 7 x weekly - 2 sets - 10 reps - 5 hold  - Hooklying Clamshell with Resistance  - 1 x daily - 7 x weekly - 2 sets - 10 reps - 4 hold  - Bent Knee Fallouts with Alternating Legs  - 1 x daily - 7 x weekly - 2 sets - 10 reps  - Supine Transversus Abdominis Bracing - Hands on

## 2024-08-07 ENCOUNTER — HOSPITAL ENCOUNTER (OUTPATIENT)
Dept: PHYSICAL THERAPY | Facility: CLINIC | Age: 84
Setting detail: THERAPIES SERIES
Discharge: HOME | End: 2024-08-07
Payer: MEDICARE

## 2024-08-07 PROCEDURE — 97110 THERAPEUTIC EXERCISES: CPT

## 2024-08-07 NOTE — FLOWSHEET NOTE
hold  - Gastroc Stretch on Wall  - 1 x daily - 7 x weekly - 3 sets - 30 hold  - Seated Gastroc Stretch with Strap  - 1 x daily - 7 x weekly - 3 sets - 30 hold  - Standing Hip Extension with Counter Support  - 1 x daily - 7 x weekly - 3 sets - 10 reps  - Standing Hip Flexion with Counter Support  - 1 x daily - 7 x weekly - 3 sets - 10 reps  - Standing Hip Abduction with Counter Support  - 1 x daily - 7 x weekly - 3 sets - 10 reps  - Sit to Stand Without Arm Support  - 1 x daily - 7 x weekly - 3 sets - 10 reps  - Prone Quadriceps Stretch with Strap  - 1 x daily - 7 x weekly - 3 sets - 30 hold  - Supine Double Knee to Chest  - 2 x daily - 7 x weekly - 3 sets - 30 hold    Plan: [x] Continue current frequency toward long and short term goals.    [] Specific Instructions for subsequent treatments:       Time In: 2:00pm            Time Out: 2:52 pm    Electronically signed by:  Haley Lopez, PTA

## 2024-08-12 ENCOUNTER — HOSPITAL ENCOUNTER (OUTPATIENT)
Dept: PHYSICAL THERAPY | Facility: CLINIC | Age: 84
Setting detail: THERAPIES SERIES
Discharge: HOME | End: 2024-08-12
Payer: MEDICARE

## 2024-08-12 PROCEDURE — 97110 THERAPEUTIC EXERCISES: CPT

## 2024-08-12 PROCEDURE — 97112 NEUROMUSCULAR REEDUCATION: CPT

## 2024-08-12 NOTE — FLOWSHEET NOTE
[] [facility]  Outpatient Rehabilitation &  Therapy  [address]  [phone]  [phone] [] [facility]  Outpatient Rehabilitation &  Therapy  [address]  P: [phone]  F: [phone] [x] [facility]  Outpatient Rehabilitation &  Therapy  [address]  P: [phone]  F: [phone] [] [facility]  Outpatient Rehabilitation &  Therapy  [address]  [phone]  [phone] [] [facility]  Outpatient Rehabilitation &  Therapy  [address]   P: [phone]  F: [phone]  [] [facility]  Outpatient Rehabilitation &  Therapy  [address]  P: [phone]  F: [phone] [] [facility]  Outpatient Rehabilitation &  Therapy  [address]  P: [phone]  F: [phone] [] [facility]  Outpatient Rehabilitation &  Therapy  [address]  P: [phone]  F: [phone] [] [facility]  Outpatient Rehabilitation &  Therapy  [address]  P: [phone]  F: [phone]  [] [facility] Outpatient Rehabilitation &  Therapy  [address]  P: [phone]  F: [phone]     Physical Therapy Daily Treatment Note    Date:  2024  Patient Name:  Jan Ventura    :  1940  MRN: [id number]  Physician: Gaviota Hector PA                               Insurance: MEDICARE-visits bmn no hard max no auth required MEDICAL M[employer]UAL MEDICARE SUPP- Follow Medicare guidelines no auth required  Medical Diagnosis: M21.372 (ICD-10-CM) - Left foot drop  M54.50 (ICD-10-CM) - Lumbar pain  Rehab Codes: M62.81, R26.2, M54.5  Onset Date: 3/1/24                 Next 's appt.: 24    Visit# / total visits:  (1-2x wk)           Progress note for Medicare patient due at visit 10     Cancels/No Shows:

## 2024-08-14 ENCOUNTER — OFFICE VISIT (OUTPATIENT)
Age: 84
End: 2024-08-14
Payer: MEDICARE

## 2024-08-14 VITALS
WEIGHT: 159 LBS | HEART RATE: 68 BPM | HEIGHT: 68 IN | BODY MASS INDEX: 24.1 KG/M2 | RESPIRATION RATE: 16 BRPM | SYSTOLIC BLOOD PRESSURE: 127 MMHG | DIASTOLIC BLOOD PRESSURE: 73 MMHG

## 2024-08-14 DIAGNOSIS — M48.061 FORAMINAL STENOSIS OF LUMBAR REGION: Primary | ICD-10-CM

## 2024-08-14 DIAGNOSIS — M21.372 LEFT FOOT DROP: ICD-10-CM

## 2024-08-14 PROCEDURE — G8427 DOCREV CUR MEDS BY ELIG CLIN: HCPCS | Performed by: NEUROLOGICAL SURGERY

## 2024-08-14 PROCEDURE — 1123F ACP DISCUSS/DSCN MKR DOCD: CPT | Performed by: NEUROLOGICAL SURGERY

## 2024-08-14 PROCEDURE — G8420 CALC BMI NORM PARAMETERS: HCPCS | Performed by: NEUROLOGICAL SURGERY

## 2024-08-14 PROCEDURE — 1036F TOBACCO NON-USER: CPT | Performed by: NEUROLOGICAL SURGERY

## 2024-08-14 PROCEDURE — 99213 OFFICE O/P EST LOW 20 MIN: CPT | Performed by: NEUROLOGICAL SURGERY

## 2024-08-14 RX ORDER — ASPIRIN 325 MG
325 TABLET ORAL DAILY
COMMUNITY

## 2024-08-14 RX ORDER — CHOLECALCIFEROL (VITAMIN D3) 1250 MCG
CAPSULE ORAL
COMMUNITY

## 2024-08-14 RX ORDER — M-VIT,TX,IRON,MINS/CALC/FOLIC 27MG-0.4MG
1 TABLET ORAL DAILY
COMMUNITY

## 2024-08-14 NOTE — PROGRESS NOTES
[facility] NEUROSCIENCE [place], [facility] NEUROSURGERY  [address], [address]  [place] [id number]  Dept: [phone]  Dept Fax: [phone]     Patient:  Jan Ventura  YOB: 1940  Date: 8/14/24      Chief Complaint   Patient presents with    Follow-up     Lumbar- follow up with PT           HPI:     I had the pleasure of seeing this patient back in the office today in follow-up.  As you know he is a 83-year-old male who presented to my office with a 1 year history of left lower extremity discomfort coursing in an L5 nerve distribution as well as an ongoing left foot drop.  MRI imaging as well as a myelogram CT of the lumbar spine demonstrated mild to moderate central stenosis at the L2-3 and L3-4 levels.  Additionally at the L4-5 level the patient did have broad-based disc bulging but no clear evidence of focal nerve root impingement.  Extensive degenerative changes were noted throughout the lumbar spine.  Additionally the patient did undergo EMG nerve conduction study which was consistent with a subacute left L5-S1 radiculopathy.  The patient has now completed half of this physical therapy.  With time and therapy he indicates his level of pain has improved 50 to 60%.  He does feel that his foot drop has improved approxi-10 to 20%.  He has no new complaints at today's visit.  Examination today demonstrates slight improvement of his left dorsiflexion weakness but he does have an ongoing significant foot drop.  He has normal dorsiflexion on the right.  At this point time given the patient's clinical improvement, we will have him continue with physical therapy for the time being.  I did offer him a foot brace which he declined.  I took this opportunity to answer intermittent questions that he and his wife may have had.  Again on his current spine imaging studies, there is no clear evidence of focal nerve root impingement which would

## 2024-08-15 ENCOUNTER — HOSPITAL ENCOUNTER (OUTPATIENT)
Dept: PHYSICAL THERAPY | Facility: CLINIC | Age: 84
Setting detail: THERAPIES SERIES
Discharge: HOME | End: 2024-08-15
Payer: MEDICARE

## 2024-08-15 PROCEDURE — 97110 THERAPEUTIC EXERCISES: CPT

## 2024-08-15 NOTE — PROGRESS NOTES
0/0  Date range of services: 7/18/24 to 8/15/24      Subjective:  Pain:  [x] Yes  [] No  Location: lumbar Pain Rating: (0-10 scale) -/10  Pain altered Tx:  [] No  [] Yes  Action:  Comments: Patient reports improvement in reduced LBP pain is now intermittent 3/10 with walking uneven ground. Foot drop continues but he feels it is better. Patient reports that he had his follow up with the neurosurgeon and was told he did dnot need surgery and to continue with physical therapy.    Objective:  Test Measurements:  DF knee extended -15 degrees  DF knee flexed -7 degrees    Modalities:   Precautions:   Exercises:  Exercise Reps/ Time Weight/ Level Comments  HEP   Nustep  6' L3        SKTC            HS stretch  3x30          Gastroc/soleus stretch  90\"x2   Tilt board L3 x x   Biridge 20x3\"  lime     x    Piriformis stretch 60      x   Seated DF w/ ankle weight  20x5\"    pt on elevated surface with ankle weight on top of foot.   x   KB DF 2x10 5lbs  x    P brevis isometric   0   x     Supine clams w/TA 15x5\" Blue  TA cues   x   Bent knee fall out w/TA 10x      x   TA iso  10x10\"      x   Clams   20x3\"  blue L only x x   Reverse clams   15x3\"  2#  x x   STS w/ TB 10x Lime         gym            3-way hip 2x10 2#  VC's to maintain DF x x   Lat amb-monster 3L Orange  No UE       Tandem stance 2x30\" B          HR/TR 2x10   As able with LLE TR      NBOS on foam EC 3x30\"   Gait belt added 8/12      NBOS on foam EO 3x30\"   Gait belt: 1# head turns, 2# alt arm swings, 3# both added 8/12      Marches on foam  2x1'   Gait belt: no UE      Fwd/lat/back step outs 10xea   Gait belt: out of // bars added 8/12 x      Step up  20  4\"  L LE  VC's to maintain DF x      heel raises  2x10     x     Other:  Specific Instructions for next treatment: paloff press,  carry,  modified single heel raise. Continue with core strengthening eccentric DF     Treatment Charges: Mins Units   []  Modalities       [x]  Ther Exercise 40 3   []  Manual

## 2024-08-19 ENCOUNTER — HOSPITAL ENCOUNTER (OUTPATIENT)
Dept: PHYSICAL THERAPY | Facility: CLINIC | Age: 84
Setting detail: THERAPIES SERIES
Discharge: HOME | End: 2024-08-19
Payer: MEDICARE

## 2024-08-19 PROCEDURE — 97110 THERAPEUTIC EXERCISES: CPT

## 2024-08-19 NOTE — FLOWSHEET NOTE
balance       Time In: 6:00pm            Time Out: 7:00 pm     Electronically signed by Haley Lopez PTA on 8/19/2024 at 6:02 PM

## 2024-08-21 ENCOUNTER — HOSPITAL ENCOUNTER (OUTPATIENT)
Dept: PHYSICAL THERAPY | Facility: CLINIC | Age: 84
Setting detail: THERAPIES SERIES
Discharge: HOME | End: 2024-08-21
Payer: MEDICARE

## 2024-08-21 PROCEDURE — 97110 THERAPEUTIC EXERCISES: CPT

## 2024-08-21 NOTE — FLOWSHEET NOTE
improve balance       Time In: 2:00pm            Time Out: 3:00 pm     Electronically signed by Haley Lopez PTA on 8/21/2024 at 2:06 PM

## 2024-08-26 ENCOUNTER — HOSPITAL ENCOUNTER (OUTPATIENT)
Dept: PHYSICAL THERAPY | Facility: CLINIC | Age: 84
Setting detail: THERAPIES SERIES
Discharge: HOME | End: 2024-08-26
Payer: MEDICARE

## 2024-08-26 PROCEDURE — 97110 THERAPEUTIC EXERCISES: CPT

## 2024-08-26 NOTE — FLOWSHEET NOTE
[] [facility]  Outpatient Rehabilitation &  Therapy  [address]  [phone]  [phone] [] [facility]  Outpatient Rehabilitation &  Therapy  [address]  P: [phone]  F: [phone] [] [facility]  Outpatient Rehabilitation &  Therapy  [address]  P: [phone]  F: [phone] [] [facility]  Outpatient Rehabilitation &  Therapy  [address]  [phone]  [phone] [] [facility]  Outpatient Rehabilitation &  Therapy  [address]   P: [phone]  F: [phone]  [] [facility]  Outpatient Rehabilitation &  Therapy  [address]  P: [phone]  F: [phone] [] [facility]  Outpatient Rehabilitation &  Therapy  [address]  P: [phone]  F: [phone] [] [facility]  Outpatient Rehabilitation &  Therapy  [address]  P: [phone]  F: [phone] [] [facility]  Outpatient Rehabilitation &  Therapy  [address]  P: [phone]  F: [phone]  [] [facility] Outpatient Rehabilitation &  Therapy  [address]  P: [phone]  F: [phone]     Physical Therapy Progress Note    Date: 2024      Patient: Jan Ventura  : 1940  MRN: [id number]  Physician: Gaviota Hector PA                               Insurance: MEDICARE-visits bmn no hard max no auth required MEDICAL M[employer] MEDICARE SUPP- Follow Medicare guidelines no auth required  Medical Diagnosis: M21.372 (ICD-10-CM) - Left foot drop  M54.50 (ICD-10-CM) - Lumbar pain  Rehab Codes: M62.81, R26.2, M54.5  Onset Date: 3/1/24                 Next 's appt.: 24  Visit# / total visits:  (1-2x wk)     Progress note for Medicare patient due at visit 16                                     Cancels/No Shows:  0/0  Date range of services: 7/18/24 to 8/15/24      Subjective:  Pain:  [] Yes  [x] No  Location: lumbar Pain Rating: (0-10 scale) -/10  Pain altered Tx:  [] No  [] Yes  Action:  Comments: Pt reported no pain in LB on arrival or issues after last tx.    Objective:  Test Measurements:  DF knee extended -15 degrees  DF knee flexed -7 degrees    Modalities:   Precautions:   Exercises:  Exercise Reps/ Time Weight/ Level Comments  HEP   Nustep  6' L3   xx     SKTC            HS stretch  3x30          Gastroc/soleus stretch  90\"x2   Tilt board L4 xx x   Biridge 20x3\"  lime     x    Piriformis stretch 60      x   Seated DF w/ ankle weight  20x5\"    pt on elevated surface with ankle weight on top of foot.   x   KB DF 2x10 5lbs  xx    P brevis isometric   0   x     Supine clams w/TA 15x5\" Blue  TA cues   x   Bent knee fall out w/TA 10x      x   TA iso  10x10\"      x   Clams   20x3\"  blue L only xx x   Reverse clams   20x3\"  2#  xx x   STS w/ TB 10x Lime         gym            3-way hip 2x10 3#  VC's to maintain DF increased weight 8/26 xx x   Lat amb-monster 3L Orange  No UE       Tandem stance 2x30\" B          HR/TR 2x10   As able with LLE TR      NBOS on foam EC 3x30\"   Gait belt       NBOS on foam EO 3x30\"   Gait belt: 1# head turns, 2# alt arm swings, 3# both       Marches on foam  2x1'   Gait belt: no UE       carry 2L ea 10KB  xx    Pallof press 15x Red cord  xx    Fwd/lat/back step outs 10xea   Gait belt: out of // bars  x      Step up  20  6\"  L LE  VC's to maintain DF increased box 8/26 xx     Modified SL HR 15x5\" 12\" stool LLE on stool, pt leaning onto knee creating force.  xx     heel raises  2x10     xx     Other:  Specific Instructions for next treatment:  carry, Continue with core strengthening eccentric DF     Treatment Charges: Mins Units   []  Modalities       [x]  Ther Exercise 45 3   []  Manual Therapy     []  Ther Activities     []  Neuro Re-ed     []  Vasocompression     [] Gait     []

## 2024-08-28 ENCOUNTER — HOSPITAL ENCOUNTER (OUTPATIENT)
Dept: PHYSICAL THERAPY | Facility: CLINIC | Age: 84
Setting detail: THERAPIES SERIES
Discharge: HOME | End: 2024-08-28
Payer: MEDICARE

## 2024-08-28 PROCEDURE — 97110 THERAPEUTIC EXERCISES: CPT

## 2024-08-28 NOTE — FLOWSHEET NOTE
[] [facility]  Outpatient Rehabilitation &  Therapy  [address]  [phone]  [phone] [] [facility]  Outpatient Rehabilitation &  Therapy  [address]  P: [phone]  F: [phone] [x] [facility]  Outpatient Rehabilitation &  Therapy  [address]  P: [phone]  F: [phone] [] [facility]  Outpatient Rehabilitation &  Therapy  [address]  [phone]  [phone] [] [facility]  Outpatient Rehabilitation &  Therapy  [address]   P: [phone]  F: [phone]  [] [facility]  Outpatient Rehabilitation &  Therapy  [address]  P: [phone]  F: [phone] [] [facility]  Outpatient Rehabilitation &  Therapy  [address]  P: [phone]  F: [phone] [] [facility]  Outpatient Rehabilitation &  Therapy  [address]  P: [phone]  F: [phone] [] [facility]  Outpatient Rehabilitation &  Therapy  [address]  P: [phone]  F: [phone]  [] [facility] Outpatient Rehabilitation &  Therapy  [address]  P: [phone]  F: [phone]     Physical Therapy Progress Note    Date: 2024      Patient: Jan Ventura  : 1940  MRN: [id number]  Physician: Gaviota Hector PA                               Insurance: MEDICARE-visits bmn no hard max no auth required MED[employer] MEDICARE SUPP- Follow Medicare guidelines no auth required  Medical Diagnosis: M21.372 (ICD-10-CM) - Left foot drop  M54.50 (ICD-10-CM) - Lumbar pain  Rehab Codes: M62.81, R26.2, M54.5  Onset Date: 3/1/24                 Next 's appt.: 24  Visit# / total visits:  (1-2x wk)     Progress note for Medicare patient due at visit 16                                     Cancels/No Shows:  Charges: Mins Units   []  Modalities       [x]  Ther Exercise 45 3   []  Manual Therapy     []  Ther Activities     []  Neuro Re-ed     []  Vasocompression     [] Gait     []  Other     Total Billable time 45 3      Assessment:  [x] Progressing toward goals. Initiated with nustep for warm up followed gastroc stretches. Added standing eccentric DF's with PTA assist to further challenge control with load and 100% gravity. Pt demo good response and fatigue. Modified SL HR to further challenge gastroc control and strength. Pt was issued lime TB with ed on how to use in pallof for home use with good carryover, HEP was updated below. Pt demo improved stability during  walks with no LOB this date and min stagger. Continue with double marked ex above.                              [] No change.                          [] Other:  [x] progress report:  Continue per initial plan of care. Patient is progressing slowly with reduction in foot drop. He fatigues quickly with eccentric DF and single leg balance.SBA and multi VC's needed for safety and to reduce compensations of leaning         STG: (to be met in 8 treatments)  ? Pain: Patient to report 0/10 LBP--ON GOING  ? ROM:  ? Strength:   ? Function:  Patient to be independent with home exercise program as demonstrated by performance with correct form without cues.-MET  Demonstrate Knowledge of fall prevention  LTG: (to be met in 16 treatments)  Patient to demonstrate 3+/5 L DF and 4/5 bilateral hip abduction  Patient to demonstrate the ability to single leg balance x 10sec bilateral to reduce fall risk  Patient to demonstrate the ability to ambulate without path deviation and pelvic stability  Patient to demonstrate the ability to complete SB motions without pain     Patient goals:to reduce foot drop, decrease pain and improve balance    Pt. Education:  [x] Yes  [] No  [] Reviewed Prior HEP/Ed  Method of Education: [x] Verbal  [x] Demo  [x] Written  Comprehension of

## 2024-09-03 ENCOUNTER — TELEPHONE (OUTPATIENT)
Age: 84
End: 2024-09-03

## 2024-09-03 ENCOUNTER — HOSPITAL ENCOUNTER (OUTPATIENT)
Dept: PHYSICAL THERAPY | Facility: CLINIC | Age: 84
Setting detail: THERAPIES SERIES
Discharge: HOME | End: 2024-09-03
Payer: MEDICARE

## 2024-09-03 DIAGNOSIS — M21.372 LEFT FOOT DROP: Primary | ICD-10-CM

## 2024-09-03 DIAGNOSIS — M54.50 LUMBAR PAIN: ICD-10-CM

## 2024-09-03 PROCEDURE — 97110 THERAPEUTIC EXERCISES: CPT

## 2024-09-03 NOTE — TELEPHONE ENCOUNTER
Patient last seen in our office on 8/14/24-at this point patient had completed \"half\" of his physical therapy and pt indicated with PT and over time level of pain has improved 50-60% and his foot drop has improved 10-20%.    Patient requesting to continue with PT and is requesting updated PT order.     No f/u appt scheduled at this time.

## 2024-09-03 NOTE — TELEPHONE ENCOUNTER
Patient came to office requesting more physical therapy. He states he was told to call the office if he wanted more therapy to get a new referral.

## 2024-09-03 NOTE — FLOWSHEET NOTE
eccentric DF     Treatment Charges: Mins Units   []  Modalities       [x]  Ther Exercise 40 3   []  Manual Therapy     []  Ther Activities     []  Neuro Re-ed     []  Vasocompression     [] Gait     []  Other     Total Billable time 40 3      Assessment:  [x] Progressing toward goals. Cont with ex per above log with emphasis on LE strengthening as well as eccentric DF strengthening.  Progressed to lateral step ups, requiring cues for appropriate DF when ascending. Pt cont to demo good stability with  carry, with 1 episode of scissoring, but no LOB. Will continue to progress as sofia                                [] No change.                          [] Other:  [x] progress report:  Continue per initial plan of care. Patient is progressing slowly with reduction in foot drop. He fatigues quickly with eccentric DF and single leg balance.SBA and multi VC's needed for safety and to reduce compensations of leaning         STG: (to be met in 8 treatments)  ? Pain: Patient to report 0/10 LBP--ON GOING  ? ROM:  ? Strength:   ? Function:  Patient to be independent with home exercise program as demonstrated by performance with correct form without cues.-MET  Demonstrate Knowledge of fall prevention  LTG: (to be met in 16 treatments)  Patient to demonstrate 3+/5 L DF and 4/5 bilateral hip abduction  Patient to demonstrate the ability to single leg balance x 10sec bilateral to reduce fall risk  Patient to demonstrate the ability to ambulate without path deviation and pelvic stability  Patient to demonstrate the ability to complete SB motions without pain     Patient goals:to reduce foot drop, decrease pain and improve balance    Pt. Education:  [x] Yes  [] No  [x] Reviewed Prior HEP/Ed  Method of Education: [x] Verbal  [x] Demo  [] Written  Comprehension of Education:  [x] Verbalizes understanding.  [x] Demonstrates understanding.  [] Needs review.  [] Demonstrates/verbalizes HEP/Ed previously given.             Access

## 2024-09-05 ENCOUNTER — HOSPITAL ENCOUNTER (OUTPATIENT)
Dept: PHYSICAL THERAPY | Facility: CLINIC | Age: 84
Setting detail: THERAPIES SERIES
Discharge: HOME | End: 2024-09-05
Payer: MEDICARE

## 2024-09-05 PROCEDURE — 97110 THERAPEUTIC EXERCISES: CPT

## 2024-09-05 NOTE — PROGRESS NOTES
[] [facility]  Outpatient Rehabilitation &  Therapy  [address]  [phone]  [phone] [] [facility]  Outpatient Rehabilitation &  Therapy  [address]  P: [phone]  F: [phone] [x] [facility]  Outpatient Rehabilitation &  Therapy  [address]  P: [phone]  F: [phone] [] [facility]  Outpatient Rehabilitation &  Therapy  [address]  [phone]  [phone] [] [facility]  Outpatient Rehabilitation &  Therapy  [address]   P: [phone]  F: [phone]  [] [facility]  Outpatient Rehabilitation &  Therapy  [address]  P: [phone]  F: [phone] [] [facility]  Outpatient Rehabilitation &  Therapy  [address]  P: [phone]  F: [phone] [] [facility]  Outpatient Rehabilitation &  Therapy  [address]  P: [phone]  F: [phone] [] [facility]  Outpatient Rehabilitation &  Therapy  [address]  P: [phone]  F: [phone]  [] [facility] Outpatient Rehabilitation &  Therapy  [address]  P: [phone]  F: [phone]     Physical Therapy Progress Note    Date: 2024      Patient: Jan Ventura  : 1940  MRN: [id number]  Physician: Gaviota Hector PA                               Insurance: MEDICARE-visits bmn no hard max no auth required MED[employer] M[employer] MEDICARE SUPP- Follow Medicare guidelines no auth required  Medical Diagnosis: M21.372 (ICD-10-CM) - Left foot drop  M54.50 (ICD-10-CM) - Lumbar pain  Rehab Codes: M62.81, R26.2, M54.5  Onset Date: 3/1/24                 Next 's appt.: 24  Visit# / total visits:                                 Cancels/No Shows: 0/0  Date range of services: 24 to 8/15/24      Subjective:  Pain:  []

## 2024-09-25 ENCOUNTER — HOSPITAL ENCOUNTER (OUTPATIENT)
Dept: PHYSICAL THERAPY | Facility: CLINIC | Age: 84
Setting detail: THERAPIES SERIES
Discharge: HOME | End: 2024-09-25
Payer: MEDICARE

## 2024-10-06 ENCOUNTER — HOSPITAL ENCOUNTER (EMERGENCY)
Age: 84
Discharge: HOME | End: 2024-10-06
Attending: EMERGENCY MEDICINE
Payer: MEDICARE

## 2024-10-06 ENCOUNTER — APPOINTMENT (OUTPATIENT)
Dept: CT IMAGING | Age: 84
End: 2024-10-06
Payer: MEDICARE

## 2024-10-06 ENCOUNTER — APPOINTMENT (OUTPATIENT)
Dept: GENERAL RADIOLOGY | Age: 84
End: 2024-10-06
Payer: MEDICARE

## 2024-10-06 VITALS
RESPIRATION RATE: 16 BRPM | HEIGHT: 68 IN | WEIGHT: 147 LBS | DIASTOLIC BLOOD PRESSURE: 70 MMHG | SYSTOLIC BLOOD PRESSURE: 138 MMHG | OXYGEN SATURATION: 97 % | BODY MASS INDEX: 22.28 KG/M2 | HEART RATE: 98 BPM | TEMPERATURE: 97.7 F

## 2024-10-06 DIAGNOSIS — R55 SYNCOPE AND COLLAPSE: Primary | ICD-10-CM

## 2024-10-06 LAB
ALBUMIN SERPL-MCNC: 4.1 G/DL (ref 3.5–5.2)
ALBUMIN/GLOB SERPL: 1.6 {RATIO} (ref 1–2.5)
ALP SERPL-CCNC: 75 U/L (ref 40–129)
ALT SERPL-CCNC: 20 U/L (ref 5–41)
ANION GAP SERPL CALCULATED.3IONS-SCNC: 12 MMOL/L (ref 9–17)
AST SERPL-CCNC: 22 U/L
BASOPHILS # BLD: 0 K/UL (ref 0–0.2)
BASOPHILS NFR BLD: 0 % (ref 0–2)
BILIRUB SERPL-MCNC: 0.7 MG/DL (ref 0.3–1.2)
BNP SERPL-MCNC: 53 PG/ML
BUN SERPL-MCNC: 16 MG/DL (ref 8–23)
CALCIUM SERPL-MCNC: 9.3 MG/DL (ref 8.6–10.4)
CHLORIDE SERPL-SCNC: 103 MMOL/L (ref 98–107)
CO2 SERPL-SCNC: 28 MMOL/L (ref 20–31)
CREAT SERPL-MCNC: 0.9 MG/DL (ref 0.7–1.2)
EOSINOPHIL # BLD: 0.1 K/UL (ref 0–0.4)
EOSINOPHILS RELATIVE PERCENT: 2 % (ref 1–4)
ERYTHROCYTE [DISTWIDTH] IN BLOOD BY AUTOMATED COUNT: 14.7 % (ref 12.5–15.4)
GFR, ESTIMATED: 85 ML/MIN/1.73M2
GLUCOSE SERPL-MCNC: 123 MG/DL (ref 70–99)
HCT VFR BLD AUTO: 43.6 % (ref 41–53)
HGB BLD-MCNC: 14.4 G/DL (ref 13.5–17.5)
LYMPHOCYTES NFR BLD: 1 K/UL (ref 1–4.8)
LYMPHOCYTES RELATIVE PERCENT: 15 % (ref 24–44)
MCH RBC QN AUTO: 30.8 PG (ref 26–34)
MCHC RBC AUTO-ENTMCNC: 33.1 G/DL (ref 31–37)
MCV RBC AUTO: 92.9 FL (ref 80–100)
MONOCYTES NFR BLD: 0.8 K/UL (ref 0.1–1.2)
MONOCYTES NFR BLD: 12 % (ref 2–11)
NEUTROPHILS NFR BLD: 71 % (ref 36–66)
NEUTS SEG NFR BLD: 4.7 K/UL (ref 1.8–7.7)
PLATELET # BLD AUTO: 191 K/UL (ref 140–450)
PMV BLD AUTO: 8.1 FL (ref 6–12)
POTASSIUM SERPL-SCNC: 4.3 MMOL/L (ref 3.7–5.3)
PROT SERPL-MCNC: 6.6 G/DL (ref 6.4–8.3)
RBC # BLD AUTO: 4.69 M/UL (ref 4.5–5.9)
SODIUM SERPL-SCNC: 143 MMOL/L (ref 135–144)
TROPONIN I SERPL HS-MCNC: 24 NG/L (ref 0–22)
TROPONIN I SERPL HS-MCNC: 25 NG/L (ref 0–22)
WBC OTHER # BLD: 6.6 K/UL (ref 3.5–11)

## 2024-10-06 PROCEDURE — 70450 CT HEAD/BRAIN W/O DYE: CPT

## 2024-10-06 PROCEDURE — 83880 ASSAY OF NATRIURETIC PEPTIDE: CPT

## 2024-10-06 PROCEDURE — 36415 COLL VENOUS BLD VENIPUNCTURE: CPT

## 2024-10-06 PROCEDURE — 93005 ELECTROCARDIOGRAM TRACING: CPT | Performed by: PHYSICIAN ASSISTANT

## 2024-10-06 PROCEDURE — 80053 COMPREHEN METABOLIC PANEL: CPT

## 2024-10-06 PROCEDURE — 99285 EMERGENCY DEPT VISIT HI MDM: CPT

## 2024-10-06 PROCEDURE — 71045 X-RAY EXAM CHEST 1 VIEW: CPT

## 2024-10-06 PROCEDURE — 84484 ASSAY OF TROPONIN QUANT: CPT

## 2024-10-06 PROCEDURE — 85025 COMPLETE CBC W/AUTO DIFF WBC: CPT

## 2024-10-06 ASSESSMENT — PAIN - FUNCTIONAL ASSESSMENT: PAIN_FUNCTIONAL_ASSESSMENT: NONE - DENIES PAIN

## 2024-10-06 NOTE — ED PROVIDER NOTES
EMERGENCY DEPARTMENT ENCOUNTER    Pt Name: Jan Ventura  MRN: [id number]  Birthdate 1940  Date of evaluation: 10/6/24  CHIEF COMPLAINT       Chief Complaint   Patient presents with    Loss of Consciousness     Syncopal episode at home, witnessed by wife. Per wife, 5 seconds of seizure like activity after.     HISTORY OF PRESENT ILLNESS   Patient is an 84-year-old male who presents via EMS after a syncopal episode.  The EMS report was that the wife currently has home health services in the home and they witnessed him fall to the ground and have seizure-like activity for about 5 seconds, the patient reports that he was standing by his wife for a prolonged period of time.  He states that his wife is quite ill and he has been under a lot of stress and is very tired and fatigued over the past 12 days.  He felt himself start to get \"weak\".  But was not able to sit down fast enough.  He does not have a history of syncope.  He does report a history of hypertension and tachycardia.  He has not had any chest pain or shortness of breath.  No recent nausea vomiting cough fever or chills.  He does not smoke cigarettes.  He feels back to baseline now.             REVIEW OF SYSTEMS     Review of Systems   Constitutional:  Negative for chills and fever.   HENT:  Negative for ear pain, rhinorrhea and sore throat.    Eyes:  Negative for pain, discharge and itching.   Respiratory:  Negative for cough, shortness of breath and wheezing.    Cardiovascular:  Negative for chest pain and palpitations.   Gastrointestinal:  Negative for nausea and vomiting.   Genitourinary:  Negative for difficulty urinating, dysuria and hematuria.   Musculoskeletal:  Negative for back pain and myalgias.   Skin:  Negative for rash and wound.   Neurological:  Positive for syncope. Negative for dizziness and headaches.   Psychiatric/Behavioral:  Negative for dysphoric mood.      PASTMEDICAL HISTORY     Past Medical History:   Diagnosis Date    Back pain     
Course User Index  [AO] Leyla Salgado DO       FINAL IMPRESSION      1. Syncope and collapse          DISPOSITION/PLAN   DISPOSITION Decision To Discharge 10/06/2024 01:59:01 PM  Condition at Disposition: STABLE        PATIENT REFERRED TO:  [facility] Emergency Department  [address]  [facility] [address] [phone]    As needed, If symptoms worsen    Markus Back MD  [address] [place] [address]  [phone]    Schedule an appointment as soon as possible for a visit         DISCHARGE MEDICATIONS:  Current Discharge Medication List          Based on the medical record, the care appears appropriate. I was personally available for consultation in the Emergency Department. I did have a discussion with our midlevel provider regarding the care of this patient.  I reviewed the mid level provider's note and agree with the documented findings and plan of care.   I have reviewed the emergency nurses triage note. I agree with the chief complaint, past medical history, past surgical history, allergies, medications, social and family history as documented unless otherwise noted below.     EKG showed normal sinus rhythm with a 8 of 84.  Normal axis.  Normal intervals.  No acute ST-T wave changes.  There is T wave inversion as well as Q wave present in lead III.  No prior for comparison.  Not meeting any ACS or STEMI criteria    Vasovagal like syncope with temporary twitching after loss of consciousness.  No incontinence or tongue biting.  No postictal period.  Vital signs here are stable.  Workup unremarkable.  Has establish care with PCP they can follow-up with as outpatient     Leyla Salgado DO  Attending Emergency Physician        Leyla Salgado DO  10/06/24 4009

## 2024-10-06 NOTE — DISCHARGE INSTRUCTIONS
Rest.  Drink plenty of fluids.  Your testing today did not show any acute abnormality however I do recommend follow-up with your primary care doctor in 1 to 3 days    Please call their office first thing in the morning to schedule follow-up    Return to the emergency department if you begin to have any shortness of breath chest pain or other emergent concerns

## 2024-10-07 LAB
EKG ATRIAL RATE: 84 BPM
EKG P AXIS: 46 DEGREES
EKG P-R INTERVAL: 184 MS
EKG Q-T INTERVAL: 364 MS
EKG QRS DURATION: 108 MS
EKG QTC CALCULATION (BAZETT): 430 MS
EKG R AXIS: 46 DEGREES
EKG T AXIS: 42 DEGREES
EKG VENTRICULAR RATE: 84 BPM

## 2024-10-07 PROCEDURE — 93010 ELECTROCARDIOGRAM REPORT: CPT | Performed by: INTERNAL MEDICINE

## 2024-10-24 ENCOUNTER — HOSPITAL ENCOUNTER (OUTPATIENT)
Dept: PHYSICAL THERAPY | Facility: CLINIC | Age: 84
Setting detail: THERAPIES SERIES
Discharge: HOME | End: 2024-10-24
Payer: MEDICARE

## 2024-10-24 PROCEDURE — 97110 THERAPEUTIC EXERCISES: CPT

## 2024-10-24 PROCEDURE — 97140 MANUAL THERAPY 1/> REGIONS: CPT

## 2024-10-24 NOTE — FLOWSHEET NOTE
[] [facility]  Outpatient Rehabilitation &  Therapy  [address]  [phone]  [phone] [] [facility]  Outpatient Rehabilitation &  Therapy  [address]  P: [phone]  F: [phone] [] [facility]  Outpatient Rehabilitation &  Therapy  [address]  P: [phone]  F: [phone] [] [facility]  Outpatient Rehabilitation &  Therapy  [address]  [phone]  [phone] [] [facility]  Outpatient Rehabilitation &  Therapy  [address]   P: [phone]  F: [phone]  [] [facility]  Outpatient Rehabilitation &  Therapy  [address]  P: [phone]  F: [phone] [] [facility]  Outpatient Rehabilitation &  Therapy  [address]  P: [phone]  F: [phone] [] [facility]  Outpatient Rehabilitation &  Therapy  [address]  P: [phone]  F: [phone] [] [facility]  Outpatient Rehabilitation &  Therapy  [address]  P: [phone]  F: [phone]  [] [facility] Outpatient Rehabilitation &  Therapy  [address]  P: [phone]  F: [phone]     Physical Therapy Daily Treatment Note    Date:  10/24/2024  Patient Name:  Jan Ventura    :  1940  MRN: [id number]  Physician: Gaviota Hector PA                               Insurance: MEDICARE-visits bmn no hard max no auth required MED[employer] MEDICARE SUPP- Follow Medicare guidelines no auth required  Medical Diagnosis: M21.372 (ICD-10-CM) - Left foot drop  M54.50 (ICD-10-CM) - Lumbar pain  Rehab Codes: M62.81, R26.2, M54.5  Onset Date: 3/1/24                 Next 's appt.: 24  Visit# / total visits:1/10                              Cancels/No Shows: 0/0  Date range of services: 24 to 8/15/24    Subjective:

## 2024-11-05 ENCOUNTER — HOSPITAL ENCOUNTER (OUTPATIENT)
Dept: PHYSICAL THERAPY | Facility: CLINIC | Age: 84
Setting detail: THERAPIES SERIES
Discharge: HOME | End: 2024-11-05
Payer: MEDICARE

## 2024-11-05 PROCEDURE — 97110 THERAPEUTIC EXERCISES: CPT

## 2024-11-05 PROCEDURE — 97140 MANUAL THERAPY 1/> REGIONS: CPT

## 2024-11-05 NOTE — FLOWSHEET NOTE
[] [facility]  Outpatient Rehabilitation &  Therapy  [address]  [phone]  [phone] [] [facility]  Outpatient Rehabilitation &  Therapy  [address]  P: [phone]  F: [phone] [] [facility]  Outpatient Rehabilitation &  Therapy  [address]  P: [phone]  F: [phone] [] [facility]  Outpatient Rehabilitation &  Therapy  [address]  [phone]  [phone] [] [facility]  Outpatient Rehabilitation &  Therapy  [address]   P: [phone]  F: [phone]  [] [facility]  Outpatient Rehabilitation &  Therapy  [address]  P: [phone]  F: [phone] [] [facility]  Outpatient Rehabilitation &  Therapy  [address]  P: [phone]  F: [phone] [] [facility]  Outpatient Rehabilitation &  Therapy  [address]  P: [phone]  F: [phone] [] [facility]  Outpatient Rehabilitation &  Therapy  [address]  P: [phone]  F: [phone]  [] [facility] Outpatient Rehabilitation &  Therapy  [address]  P: [phone]  F: [phone]     Physical Therapy Daily Treatment Note    Date:  2024  Patient Name:  Jan Ventura    :  1940  MRN: [id number]  Physician: Gaviota Hector PA                               Insurance: MEDICARE-visits bmn no hard max no auth required MEDICAL MUTUAL MEDICARE SUPP- Follow Medicare guidelines no auth required  Medical Diagnosis: M21.372 (ICD-10-CM) - Left foot drop  M54.50 (ICD-10-CM) - Lumbar pain  Rehab Codes: M62.81, R26.2, M54.5  Onset Date: 3/1/24                 Next 's appt.: 24  Visit# / total visits:2/10                              Cancels/No Shows: 0/0      Subjective:    Pain:  [] Yes  [] No Location:  N/A Pain

## 2024-11-12 ENCOUNTER — HOSPITAL ENCOUNTER (OUTPATIENT)
Dept: PHYSICAL THERAPY | Facility: CLINIC | Age: 84
Setting detail: THERAPIES SERIES
Discharge: HOME | End: 2024-11-12
Payer: MEDICARE

## 2024-11-12 PROCEDURE — 97140 MANUAL THERAPY 1/> REGIONS: CPT

## 2024-11-12 PROCEDURE — 97110 THERAPEUTIC EXERCISES: CPT

## 2024-11-12 NOTE — FLOWSHEET NOTE
Reviewed Prior HEP/Ed  Method of Education: [] Verbal  [] Demo  [] Written  Comprehension of Education:  [] Verbalizes understanding.  [] Demonstrates understanding.  [] Needs review.  [] Demonstrates/verbalizes HEP/Ed previously given.     Plan: [x] Continue current frequency toward long and short term goals.          Time In:3pm            Time Out: 4pm    Electronically signed by:  Rhonda Watson PT

## 2024-11-21 ENCOUNTER — HOSPITAL ENCOUNTER (OUTPATIENT)
Dept: PHYSICAL THERAPY | Facility: CLINIC | Age: 84
Setting detail: THERAPIES SERIES
Discharge: HOME | End: 2024-11-21
Payer: MEDICARE

## 2024-11-21 PROCEDURE — 97110 THERAPEUTIC EXERCISES: CPT

## 2024-11-21 PROCEDURE — 97140 MANUAL THERAPY 1/> REGIONS: CPT

## 2024-11-21 NOTE — FLOWSHEET NOTE
Patient goals:to reduce foot drop, decrease pain and improve balance       Pt. Education:  [] Yes  [x] No  [] Reviewed Prior HEP/Ed  Method of Education: [] Verbal  [] Demo  [] Written  Comprehension of Education:  [] Verbalizes understanding.  [] Demonstrates understanding.  [] Needs review.  [] Demonstrates/verbalizes HEP/Ed previously given.     Plan: [x] Continue current frequency toward long and short term goals.          Time In:3pm            Time Out: 4pm    Electronically signed by:  Rhonda Watson, PT

## 2024-11-26 ENCOUNTER — HOSPITAL ENCOUNTER (OUTPATIENT)
Dept: PHYSICAL THERAPY | Facility: CLINIC | Age: 84
Setting detail: THERAPIES SERIES
Discharge: HOME | End: 2024-11-26
Payer: MEDICARE

## 2024-11-26 PROCEDURE — 97140 MANUAL THERAPY 1/> REGIONS: CPT

## 2024-11-26 PROCEDURE — 97110 THERAPEUTIC EXERCISES: CPT

## 2024-11-26 NOTE — FLOWSHEET NOTE
Rating: (0-10 scale) 0/10  Pain altered Tx:  [] No  [] Yes  Action:  Comments: Patient reports no back pain    Objective:  Modalities: STR gastroc soleus, DF AAROM, peroneals, posterior tib  Precautions [] No  [x] Yes: fall risk due to L drop foot   Exercises:  Exercise Reps/ Time Weight/ Level Comments   Retro abulation 3laps  In gym walk behind patient for safety use gait belt    Wt'd DF fat 5lbs    Step tap F/L 20ea 6in    Standing tibial touch 20     Calf stretch [id number]     LAQ  20  W/ strap over pressure   Strap stretch 3x60\"     Contract relax x5                                                                     Treatment Charges: Mins Units Time In/Out   []  Modalities        [x]  Ther Exercise 30 2    []  Neuromuscular Re-ed      []  Gait Training      [x]  Manual Therapy 12 1    []  Ther Activities      []  Aquatics      []  Vasocompression      []  Cervical Traction      []  Other      Total Billable time 42 min 3         Assessment: [] Progressing toward goals.    [] No change.     [x] Other: Added standing DF. Patient reported increased difficulty obtaining  anterior tib contraction and quick fatigue. Reduced LOB with retro ambulation VC's given to slow pace and increase step length  [x] Patient would continue to benefit from skilled physical therapy services in order to: improve mobility  STG: (to be met in 8 treatments)  ? Pain: Patient to report 0/10 LBP--ON GOING  ? ROM:  ? Strength:   ? Function:  Patient to be independent with home exercise program as demonstrated by performance with correct form without cues.-MET  Demonstrate Knowledge of fall prevention  LTG: (to be met in 16 treatments)  Patient to demonstrate 3+/5 L DF and 4/5 bilateral hip abduction--ON GOING  Patient to demonstrate the ability to single leg balance x 10sec bilateral to reduce fall risk--ON GOING  Patient to demonstrate the ability to ambulate without path deviation and pelvic stability--MET  Patient to demonstrate the

## 2024-12-05 ENCOUNTER — HOSPITAL ENCOUNTER (OUTPATIENT)
Dept: PHYSICAL THERAPY | Facility: CLINIC | Age: 84
Setting detail: THERAPIES SERIES
Discharge: HOME | End: 2024-12-05
Payer: MEDICARE

## 2024-12-05 PROCEDURE — 97140 MANUAL THERAPY 1/> REGIONS: CPT

## 2024-12-05 PROCEDURE — 97110 THERAPEUTIC EXERCISES: CPT

## 2024-12-05 NOTE — FLOWSHEET NOTE
[] [facility]  Outpatient Rehabilitation &  Therapy  [address]  [phone]  [phone] [] [facility]  Outpatient Rehabilitation &  Therapy  [address]  P: [phone]  F: [phone] [x] [facility]  Outpatient Rehabilitation &  Therapy  [address]  P: [phone]  F: [phone] [] [facility]  Outpatient Rehabilitation &  Therapy  [address]  [phone]  [phone] [] [facility]  Outpatient Rehabilitation &  Therapy  [address]   P: [phone]  F: [phone]  [] [facility]  Outpatient Rehabilitation &  Therapy  [address]  P: [phone]  F: [phone] [] [facility]  Outpatient Rehabilitation &  Therapy  [address]  P: [phone]  F: [phone] [] [facility]  Outpatient Rehabilitation &  Therapy  [address]  P: [phone]  F: [phone] [] [facility]  Outpatient Rehabilitation &  Therapy  [address]  P: [phone]  F: [phone]  [] [facility] Outpatient Rehabilitation &  Therapy  [address]  P: [phone]  F: [phone]     Physical Therapy Daily Treatment Note    Date:  2024  Patient Name:  Jan Ventura    :  1940  MRN: [id number]  Physician: Gaviota Hector PA                               Insurance: MEDICARE-visits bmn no hard max no auth required MEDICAL MUTUAL MEDICARE SUPP- Follow Medicare guidelines no auth required  Medical Diagnosis: M21.372 (ICD-10-CM) - Left foot drop  M54.50 (ICD-10-CM) - Lumbar pain  Rehab Codes: M62.81, R26.2, M54.5  Onset Date: 3/1/24                 Next 's appt.: 24  Visit# / total visits:6/10                              Cancels/No Shows: 0/0      Subjective:    Pain:  [] Yes  [x] No Location:  N/A Pain

## 2024-12-11 ENCOUNTER — HOSPITAL ENCOUNTER (OUTPATIENT)
Dept: PHYSICAL THERAPY | Facility: CLINIC | Age: 84
Setting detail: THERAPIES SERIES
Discharge: HOME | End: 2024-12-11
Payer: MEDICARE

## 2024-12-11 PROCEDURE — 97140 MANUAL THERAPY 1/> REGIONS: CPT

## 2024-12-11 PROCEDURE — 97110 THERAPEUTIC EXERCISES: CPT

## 2024-12-11 NOTE — FLOWSHEET NOTE
[] [facility]  Outpatient Rehabilitation &  Therapy  [address]  [phone]  [phone] [] [facility]  Outpatient Rehabilitation &  Therapy  [address]  P: [phone]  F: [phone] [x] [facility]  Outpatient Rehabilitation &  Therapy  [address]  P: [phone]  F: [phone] [] [facility]  Outpatient Rehabilitation &  Therapy  [address]  [phone]  [phone] [] [facility]  Outpatient Rehabilitation &  Therapy  [address]   P: [phone]  F: [phone]  [] [facility]  Outpatient Rehabilitation &  Therapy  [address]  P: [phone]  F: [phone] [] [facility]  Outpatient Rehabilitation &  Therapy  [address]  P: [phone]  F: [phone] [] [facility]  Outpatient Rehabilitation &  Therapy  [address]  P: [phone]  F: [phone] [] [facility]  Outpatient Rehabilitation &  Therapy  [address]  P: [phone]  F: [phone]  [] [facility] Outpatient Rehabilitation &  Therapy  [address]  P: [phone]  F: [phone]     Physical Therapy Daily Treatment Note    Date:  2024  Patient Name:  Jan Ventura    :  1940  MRN: [id number]  Physician: Gaviota Hector PA                               Insurance: MEDICARE-visits bmn no hard max no auth required MEDICAL MUTUAL MEDICARE SUPP- Follow Medicare guidelines no auth required  Medical Diagnosis: M21.372 (ICD-10-CM) - Left foot drop  M54.50 (ICD-10-CM) - Lumbar pain  Rehab Codes: M62.81, R26.2, M54.5  Onset Date: 3/1/24                 Next 's appt.: 24  Visit# / total visits:7/10                              Cancels/No Shows: 0/0      Subjective:    Pain:  [] Yes  [x] No Location:

## 2024-12-17 ENCOUNTER — HOSPITAL ENCOUNTER (OUTPATIENT)
Dept: PHYSICAL THERAPY | Facility: CLINIC | Age: 84
Setting detail: THERAPIES SERIES
Discharge: HOME | End: 2024-12-17
Payer: MEDICARE

## 2024-12-17 PROCEDURE — 97140 MANUAL THERAPY 1/> REGIONS: CPT

## 2024-12-17 PROCEDURE — 97110 THERAPEUTIC EXERCISES: CPT

## 2024-12-17 NOTE — FLOWSHEET NOTE
[] [facility]  Outpatient Rehabilitation &  Therapy  [address]  [phone]  [phone] [] [facility]  Outpatient Rehabilitation &  Therapy  [address]  P: [phone]  F: [phone] [x] [facility]  Outpatient Rehabilitation &  Therapy  [address]  P: [phone]  F: [phone] [] [facility]  Outpatient Rehabilitation &  Therapy  [address]  [phone]  [phone] [] [facility]  Outpatient Rehabilitation &  Therapy  [address]   P: [phone]  F: [phone]  [] [facility]  Outpatient Rehabilitation &  Therapy  [address]  P: [phone]  F: [phone] [] [facility]  Outpatient Rehabilitation &  Therapy  [address]  P: [phone]  F: [phone] [] [facility]  Outpatient Rehabilitation &  Therapy  [address]  P: [phone]  F: [phone] [] [facility]  Outpatient Rehabilitation &  Therapy  [address]  P: [phone]  F: [phone]  [] [facility] Outpatient Rehabilitation &  Therapy  [address]  P: [phone]  F: [phone]     Physical Therapy Daily Treatment Note    Date:  2024  Patient Name:  Jan Ventura    :  1940  MRN: [id number]  Physician: Gaviota Hector PA                               Insurance: MEDICARE-visits bmn no hard max no auth required MEDICAL MUTUAL MEDICARE SUPP- Follow Medicare guidelines no auth required  Medical Diagnosis: M21.372 (ICD-10-CM) - Left foot drop  M54.50 (ICD-10-CM) - Lumbar pain  Rehab Codes: M62.81, R26.2, M54.5  Onset Date: 3/1/24                 Next 's appt.: 24  Visit# / total visits:8/10                              Cancels/No Shows: 0/0      Subjective:    Pain:  [] Yes  [x] No Location:

## 2025-01-02 ENCOUNTER — HOSPITAL ENCOUNTER (OUTPATIENT)
Dept: PHYSICAL THERAPY | Facility: CLINIC | Age: 85
Setting detail: THERAPIES SERIES
Discharge: HOME | End: 2025-01-02
Payer: MEDICARE

## 2025-01-02 PROCEDURE — 97140 MANUAL THERAPY 1/> REGIONS: CPT

## 2025-01-02 PROCEDURE — 97110 THERAPEUTIC EXERCISES: CPT

## 2025-01-02 NOTE — FLOWSHEET NOTE
[] [facility]  Outpatient Rehabilitation &  Therapy  [address]  [phone]  [phone] [] [facility]  Outpatient Rehabilitation &  Therapy  [address]  P: [phone]  F: [phone] [x] [facility]  Outpatient Rehabilitation &  Therapy  [address]  P: [phone]  F: [phone] [] [facility]  Outpatient Rehabilitation &  Therapy  [address]  [phone]  [phone] [] [facility]  Outpatient Rehabilitation &  Therapy  [address]   P: [phone]  F: [phone]  [] [facility]  Outpatient Rehabilitation &  Therapy  [address]  P: [phone]  F: [phone] [] [facility]  Outpatient Rehabilitation &  Therapy  [address]  P: [phone]  F: [phone] [] [facility]  Outpatient Rehabilitation &  Therapy  [address]  P: [phone]  F: [phone] [] [facility]  Outpatient Rehabilitation &  Therapy  [address]  P: [phone]  F: [phone]  [] [facility] Outpatient Rehabilitation &  Therapy  [address]  P: [phone]  F: [phone]     Physical Therapy Daily Treatment Note    Date:  2025  Patient Name:  Jan Ventura    :  1940  MRN: [id number]  Physician: Gaviota Hector PA                               Insurance: MEDICARE-visits bmn no hard max no auth required MEDICAL MUTUAL MEDICARE SUPP- Follow Medicare guidelines no auth required  Medical Diagnosis: M21.372 (ICD-10-CM) - Left foot drop  M54.50 (ICD-10-CM) - Lumbar pain  Rehab Codes: M62.81, R26.2, M54.5  Onset Date: 3/1/24                 Next 's appt.: 24  Visit# / total visits:9/10                              Cancels/No Shows: 0/0      Subjective:    Pain:  [] Yes  [x] No Location:  N/A Pain

## 2025-01-09 ENCOUNTER — HOSPITAL ENCOUNTER (OUTPATIENT)
Dept: PHYSICAL THERAPY | Facility: CLINIC | Age: 85
Setting detail: THERAPIES SERIES
Discharge: HOME | End: 2025-01-09
Payer: MEDICARE

## 2025-01-09 PROCEDURE — 97110 THERAPEUTIC EXERCISES: CPT

## 2025-01-09 PROCEDURE — 97140 MANUAL THERAPY 1/> REGIONS: CPT

## 2025-01-09 NOTE — THERAPY DISCHARGE
N/A Pain Rating: (0-10 scale) 0/10  Pain altered Tx:  [x] No  [] Yes  Action:  Comments: Patient reports no changes since last session    Objective:  Modalities: STR gastroc soleus, peroneals, posterior tib  Precautions [] No  [x] Yes: fall risk due to L drop foot   Exercises:  Exercise Reps/ Time Weight/ Level Comments   Retro abulation 3laps  In gym walk behind patient for safety use gait belt    Standing calf stretch 2x60\"           Sitting tibial touch 20  W/ assist         Sitting EV 15     Strap stretch 5x60\"                 LAX rolling 4x60\"     Supine KB DF 20 5#                                                Treatment Charges: Mins Units Time In/Out   []  Modalities        [x]  Ther Exercise 30 2    []  Neuromuscular Re-ed      []  Gait Training      [x]  Manual Therapy 15 1    []  Ther Activities      []  Aquatics      []  Vasocompression      []  Cervical Traction      []  Other      Total Billable time 45 min 3       Assessment: [] Progressing toward goals.    [] No change.     [x] Other: reviewed above HEP and encouraged patient to continue with program questions and patient reported good understanding  [x] Patient would continue to benefit from skilled physical therapy services in order to: improve mobility  STG:   ? Pain: Patient to report 0/10 LBP--MET  ? ROM:  ? Strength:   ? Function:  Patient to be independent with home exercise program as demonstrated by performance with correct form without cues.-MET  Demonstrate Knowledge of fall prevention-MET  LTG:  Patient to demonstrate 3+/5 L DF and 4/5 bilateral hip abduction--ON GOING  Patient to demonstrate the ability to single leg balance x 10sec bilateral to reduce fall risk--ON GOING  Patient to demonstrate the ability to ambulate without path deviation and pelvic stability--MET  Patient to demonstrate the ability to complete SB motions without pain--MET     Patient goals:to reduce foot drop, decrease pain and improve balance       Pt. Education:

## 2025-03-14 ENCOUNTER — HOSPITAL ENCOUNTER (OUTPATIENT)
Dept: PREADMISSION TESTING | Age: 85
Discharge: HOME | End: 2025-03-18

## 2025-03-14 VITALS — HEIGHT: 68 IN | BODY MASS INDEX: 23.04 KG/M2 | WEIGHT: 152 LBS

## 2025-03-14 RX ORDER — VITAMIN B COMPLEX
1000 TABLET ORAL DAILY
COMMUNITY

## 2025-03-14 NOTE — PROGRESS NOTES
Pre-op Instructions For Out-Patient Surgery    Medication Instructions:  Please stop herbs and any supplements now (includes vitamins and minerals).    For these medications:  Dulaglutide (Trulicity), Exenatide (Byetta and Bydureon, Liraglutide (Victoza), Lixisenatide (Adlyxin), Semaglutide (Ozempic and Rybelsus), Tirzepatide (Mounjaro, Zepbound)- Stop 1 week prior if taking weekly or 1 day prior if taking every 12 hours or daily.     Please contact your surgeon and prescribing physician for pre-op instructions for any blood thinners.    If you have inhalers/aerosol treatments at home, please use them the morning of your surgery and bring the inhalers with you to the hospital.    Please take the following medications the morning of your surgery with a sip of water:    Amlodipine    Surgery Instructions:  After midnight before surgery:  Do not eat or drink anything, including water, mints, gum, and hard candy.  You may brush your teeth without swallowing.  No smoking, chewing tobacco, or street drugs.    Please shower or bathe before surgery.       Please do not wear any cologne, lotion, powder, deodorant, jewelry, piercings, perfume, makeup, nail polish, hair accessories, or hair spray on the day of surgery.  Wear loose comfortable clothing.    Leave your valuables at home but bring a payment source for any after-surgery prescriptions you plan to fill at [facility].  Bring a storage case for any glasses/contacts.    An adult who is responsible for you MUST drive you home and should be with you for the first 24 hours after surgery.       The Day of Surgery:  Arrive at [facility] Surgery Entrance at the time directed by your surgeon and check in at the desk.     If you have a living will or healthcare power of , please bring a copy.    You will be taken to the pre-op holding area where you will be prepared for surgery.  A physical assessment will be performed

## 2025-03-21 ENCOUNTER — ANESTHESIA EVENT (OUTPATIENT)
Dept: OPERATING ROOM | Age: 85
End: 2025-03-21
Payer: MEDICARE

## 2025-03-23 NOTE — H&P
PROVISIONAL DIAGNOSES / SURGERY:      EYE CATARACT EMULSIFICATION INTRAOCULAR LENS IMPLANT    Pre-Op Diagnosis Codes:      * Nuclear sclerotic cataract of right eye [H25.11]     There are no active problems to display for this patient.          NIECY Caro CNP on 3/24/2025 at 7:07 AM

## 2025-03-24 ENCOUNTER — ANESTHESIA (OUTPATIENT)
Dept: OPERATING ROOM | Age: 85
End: 2025-03-24
Payer: MEDICARE

## 2025-03-24 ENCOUNTER — HOSPITAL ENCOUNTER (OUTPATIENT)
Age: 85
Setting detail: OUTPATIENT SURGERY
Discharge: HOME | End: 2025-03-24
Attending: OPHTHALMOLOGY | Admitting: OPHTHALMOLOGY
Payer: MEDICARE

## 2025-03-24 VITALS
BODY MASS INDEX: 23.04 KG/M2 | TEMPERATURE: 97.9 F | SYSTOLIC BLOOD PRESSURE: 144 MMHG | OXYGEN SATURATION: 94 % | WEIGHT: 152 LBS | DIASTOLIC BLOOD PRESSURE: 82 MMHG | RESPIRATION RATE: 16 BRPM | HEART RATE: 94 BPM | HEIGHT: 68 IN

## 2025-03-24 PROCEDURE — 2500000003 HC RX 250 WO HCPCS: Performed by: OPHTHALMOLOGY

## 2025-03-24 PROCEDURE — 3700000001 HC ADD 15 MINUTES (ANESTHESIA): Performed by: OPHTHALMOLOGY

## 2025-03-24 PROCEDURE — 6360000002 HC RX W HCPCS: Performed by: OPHTHALMOLOGY

## 2025-03-24 PROCEDURE — 3600000002 HC SURGERY LEVEL 2 BASE: Performed by: OPHTHALMOLOGY

## 2025-03-24 PROCEDURE — 6360000002 HC RX W HCPCS: Performed by: ANESTHESIOLOGY

## 2025-03-24 PROCEDURE — 3600000012 HC SURGERY LEVEL 2 ADDTL 15MIN: Performed by: OPHTHALMOLOGY

## 2025-03-24 PROCEDURE — 6370000000 HC RX 637 (ALT 250 FOR IP): Performed by: OPHTHALMOLOGY

## 2025-03-24 PROCEDURE — 7100000011 HC PHASE II RECOVERY - ADDTL 15 MIN: Performed by: OPHTHALMOLOGY

## 2025-03-24 PROCEDURE — 3700000000 HC ANESTHESIA ATTENDED CARE: Performed by: OPHTHALMOLOGY

## 2025-03-24 PROCEDURE — V2632 POST CHMBR INTRAOCULAR LENS: HCPCS | Performed by: OPHTHALMOLOGY

## 2025-03-24 PROCEDURE — 2709999900 HC NON-CHARGEABLE SUPPLY: Performed by: OPHTHALMOLOGY

## 2025-03-24 PROCEDURE — 7100000010 HC PHASE II RECOVERY - FIRST 15 MIN: Performed by: OPHTHALMOLOGY

## 2025-03-24 DEVICE — STERILE UV AND BLUE LIGHT FILTERING ACRYLIC FOLDABLE ASPHERIC POSTERIOR CHAMBER INTRAOCULAR LENS
Type: IMPLANTABLE DEVICE | Site: EYE | Status: FUNCTIONAL
Brand: CLAREON

## 2025-03-24 RX ORDER — PROPARACAINE HYDROCHLORIDE 5 MG/ML
1 SOLUTION/ DROPS OPHTHALMIC EVERY 5 MIN PRN
Status: COMPLETED | OUTPATIENT
Start: 2025-03-24 | End: 2025-03-24

## 2025-03-24 RX ORDER — SODIUM CHLORIDE 0.9 % (FLUSH) 0.9 %
5-40 SYRINGE (ML) INJECTION PRN
Status: DISCONTINUED | OUTPATIENT
Start: 2025-03-24 | End: 2025-03-24 | Stop reason: HOSPADM

## 2025-03-24 RX ORDER — PREDNISOLONE ACETATE 10 MG/ML
SUSPENSION/ DROPS OPHTHALMIC PRN
Status: DISCONTINUED | OUTPATIENT
Start: 2025-03-24 | End: 2025-03-24 | Stop reason: ALTCHOICE

## 2025-03-24 RX ORDER — LIDOCAINE HYDROCHLORIDE 10 MG/ML
INJECTION, SOLUTION EPIDURAL; INFILTRATION; INTRACAUDAL; PERINEURAL PRN
Status: DISCONTINUED | OUTPATIENT
Start: 2025-03-24 | End: 2025-03-24 | Stop reason: ALTCHOICE

## 2025-03-24 RX ORDER — LIDOCAINE HYDROCHLORIDE 10 MG/ML
1 INJECTION, SOLUTION EPIDURAL; INFILTRATION; INTRACAUDAL; PERINEURAL
Status: COMPLETED | OUTPATIENT
Start: 2025-03-24 | End: 2025-03-24

## 2025-03-24 RX ORDER — FLUTICASONE PROPIONATE 50 MCG
2 SPRAY, SUSPENSION (ML) NASAL DAILY
COMMUNITY
Start: 2025-01-16 | End: 2026-01-16

## 2025-03-24 RX ORDER — SODIUM CHLORIDE 9 MG/ML
INJECTION, SOLUTION INTRAVENOUS CONTINUOUS
Status: DISCONTINUED | OUTPATIENT
Start: 2025-03-24 | End: 2025-03-24 | Stop reason: HOSPADM

## 2025-03-24 RX ORDER — TETRACAINE HYDROCHLORIDE 5 MG/ML
SOLUTION OPHTHALMIC PRN
Status: DISCONTINUED | OUTPATIENT
Start: 2025-03-24 | End: 2025-03-24 | Stop reason: ALTCHOICE

## 2025-03-24 RX ORDER — CYCLOPENTOLATE HYDROCHLORIDE 10 MG/ML
1 SOLUTION/ DROPS OPHTHALMIC PRN
Status: COMPLETED | OUTPATIENT
Start: 2025-03-24 | End: 2025-03-24

## 2025-03-24 RX ORDER — SODIUM CHLORIDE 0.9 % (FLUSH) 0.9 %
5-40 SYRINGE (ML) INJECTION EVERY 12 HOURS SCHEDULED
Status: DISCONTINUED | OUTPATIENT
Start: 2025-03-24 | End: 2025-03-24 | Stop reason: HOSPADM

## 2025-03-24 RX ORDER — SODIUM CHLORIDE 9 MG/ML
INJECTION, SOLUTION INTRAVENOUS PRN
Status: DISCONTINUED | OUTPATIENT
Start: 2025-03-24 | End: 2025-03-24 | Stop reason: HOSPADM

## 2025-03-24 RX ORDER — KETOROLAC TROMETHAMINE 5 MG/ML
1 SOLUTION OPHTHALMIC
Status: COMPLETED | OUTPATIENT
Start: 2025-03-24 | End: 2025-03-24

## 2025-03-24 RX ORDER — CIPROFLOXACIN HYDROCHLORIDE 3.5 MG/ML
SOLUTION/ DROPS TOPICAL PRN
Status: DISCONTINUED | OUTPATIENT
Start: 2025-03-24 | End: 2025-03-24 | Stop reason: ALTCHOICE

## 2025-03-24 RX ADMIN — CYCLOPENTOLATE HYDROCHLORIDE 1 DROP: 10 SOLUTION OPHTHALMIC at 06:31

## 2025-03-24 RX ADMIN — CYCLOPENTOLATE HYDROCHLORIDE 1 DROP: 10 SOLUTION OPHTHALMIC at 06:38

## 2025-03-24 RX ADMIN — LIDOCAINE HYDROCHLORIDE 1 ML: 10 INJECTION, SOLUTION EPIDURAL; INFILTRATION; INTRACAUDAL; PERINEURAL at 06:50

## 2025-03-24 RX ADMIN — Medication 1 DROP: at 06:38

## 2025-03-24 RX ADMIN — KETOROLAC TROMETHAMINE 1 DROP: 0.5 SOLUTION OPHTHALMIC at 06:31

## 2025-03-24 RX ADMIN — Medication 1 DROP: at 06:31

## 2025-03-24 RX ADMIN — CYCLOPENTOLATE HYDROCHLORIDE 1 DROP: 10 SOLUTION OPHTHALMIC at 06:50

## 2025-03-24 RX ADMIN — Medication 1 DROP: at 06:50

## 2025-03-24 ASSESSMENT — ENCOUNTER SYMPTOMS
ABDOMINAL PAIN: 0
COUGH: 1
CONSTIPATION: 0
VOMITING: 0
DIARRHEA: 0
SHORTNESS OF BREATH: 0
NAUSEA: 0
SORE THROAT: 0

## 2025-03-24 ASSESSMENT — PAIN - FUNCTIONAL ASSESSMENT
PAIN_FUNCTIONAL_ASSESSMENT: NONE - DENIES PAIN
PAIN_FUNCTIONAL_ASSESSMENT: NONE - DENIES PAIN

## 2025-03-24 NOTE — OP NOTE
Operative Note      Patient: Jan Ventura  YOB: 1940  MRN: [id number]    Date of Procedure: 3/24/2025    Pre-Op Diagnosis Codes:      * Nuclear sclerotic cataract of right eye [H25.11]    Post-Op Diagnosis: Same       Procedure(s):  EYE CATARACT EMULSIFICATION INTRAOCULAR LENS IMPLANT    Surgeon(s):  Haim Valentine MD    Assistant:   * No surgical staff found *    Anesthesia: Monitor Anesthesia Care    Estimated Blood Loss (mL): Minimal    Complications: None    Specimens:   * No specimens in log *    Implants:  Implant Name Type Inv. Item Serial No.  Lot No. LRB No. Used Action   LENS CLAREON IOL 18.5D - [id number]  LENS CLAREON IOL 18.5D [id number] [employer]  Right 1 Implanted         Drains: * No LDAs found *    Findings:  Infection Present At Time Of Surgery (PATOS) (choose all levels that have infection present):  No infection present  Other Findings: Cataract    Detailed Description of Procedure:   This is a 84-year-old gentleman having increasing difficulty with driving at night particularly with glare his best corrected vision in his right eye is 20/60 he has has 4+ brunescent cataract appears understand the risk benefits alternatives of cataract surgery in his right eye and wishes to proceed.The patient was brought back into the operating suite, placed in supine position, prepped and draped in usual standard fashion. A lid speculum was placed into the right eye. Approximately 0.3 cc of plain nonpreserved lidocaine was placed onto the eye topically. A paracentesis tract was made at the 2 o'clock position with an eye knife. Then, 0.3 cc of 1% non-preserved lidocaine was placed into the anterior chamber. This was then replaced with Viscoat. Attention was directed superiorly. A conjunctival peritomy was carried out from the 11 o'clock to 1 o'clock position. Hemostasis was obtained with wet-field cautery. A scratch incision

## 2025-03-24 NOTE — ANESTHESIA PRE PROCEDURE
Department of Anesthesiology  Preprocedure Note       Name:  Jan Ventura   Age:  84 y.o.  :  1940                                          MRN:  [id number]         Date:  3/24/2025      Surgeon: Surgeon(s):  Haim Valentine MD    Procedure: Procedure(s):  EYE CATARACT EMULSIFICATION INTRAOCULAR LENS IMPLANT    Medications prior to admission:   Prior to Admission medications    Medication Sig Start Date End Date Taking? Authorizing Provider   fluticasone (FLONASE) 50 MCG/ACT nasal spray 2 sprays by Nasal route daily 25 Yes Lili Baker MD   Vitamin D (CHOLECALCIFEROL) 25 MCG (1000 UT) TABS tablet Take 1 tablet by mouth daily   Yes Lili Baker MD   Loratadine (CLARITIN) 10 MG CHEW Take 1 tablet by mouth daily as needed   Yes Lili Baker MD   Multiple Vitamins-Minerals (THERAPEUTIC MULTIVITAMIN-MINERALS) tablet Take 1 tablet by mouth daily   Yes Lili Baker MD   amLODIPine (NORVASC) 10 MG tablet Take 1 tablet by mouth daily 3/7/22  Yes Lili Baker MD   finasteride (PROSCAR) 5 MG tablet Take 1 tablet by mouth daily 24  Yes Lili Baker MD   hydrochlorothiazide (MICROZIDE) 12.5 MG capsule Take 1 capsule by mouth daily   Yes Lili Baker MD   irbesartan (AVAPRO) 300 MG tablet Take 1 tablet by mouth every morning (before breakfast)   Yes Lili Baker MD       Current medications:    Current Facility-Administered Medications   Medication Dose Route Frequency Provider Last Rate Last Admin   • sodium chloride flush 0.9 % injection 5-40 mL  5-40 mL IntraVENous 2 times per day Judah Rojas MD       • sodium chloride flush 0.9 % injection 5-40 mL  5-40 mL IntraVENous PRN Judah Rojas MD       • 0.9 % sodium chloride infusion   IntraVENous PRN Judah Rojas MD       • 0.9 % sodium chloride infusion   IntraVENous Continuous Haim Valentine MD       • sodium chloride flush 0.9 % injection 5-40 mL  5-40 mL

## 2025-03-24 NOTE — ANESTHESIA POSTPROCEDURE EVALUATION
Department of Anesthesiology  Postprocedure Note    Patient: Jan Ventura  MRN: [id number]  YOB: 1940  Date of evaluation: 3/24/2025    Procedure Summary       Date: 03/24/25 Room / Location: [address]    Anesthesia Start: 0728 Anesthesia Stop: 0801    Procedure: EYE CATARACT EMULSIFICATION INTRAOCULAR LENS IMPLANT (Right: Eye) Diagnosis:       Nuclear sclerotic cataract of right eye      (Nuclear sclerotic cataract of right eye [H25.11])    Surgeons: Haim Valentine MD Responsible Provider: Judah Rojas MD    Anesthesia Type: MAC ASA Status: 2            Anesthesia Type: No value filed.    Luiz Phase I:      Luiz Phase II: Luiz Score: 10    Anesthesia Post Evaluation    Comments: POST- ANESTHESIA EVALUATION       Pt Name: Jan Ventura  MRN: [id number]  YOB: 1940  Date of evaluation: 3/24/2025  Time:  8:56 AM      BP (!) 144/82   Pulse 94   Temp 97.9 °F (36.6 °C) (Infrared)   Resp 16   Ht 1.727 m (5' 8\")   Wt 68.9 kg (152 lb)   SpO2 94%   BMI 23.11 kg/m²      Consciousness Level  Awake  Cardiopulmonary Status  Stable  Pain Adequately Treated YES  Nausea / Vomiting  NO  Adequate Hydration  YES  Anesthesia Related Complications NONE      Electronically signed by Judah Rojas MD on 3/24/2025 at 8:56 AM           No notable events documented.

## 2025-04-04 ENCOUNTER — ANESTHESIA EVENT (OUTPATIENT)
Dept: OPERATING ROOM | Age: 85
End: 2025-04-04
Payer: MEDICARE

## 2025-04-07 ENCOUNTER — HOSPITAL ENCOUNTER (OUTPATIENT)
Age: 85
Setting detail: OUTPATIENT SURGERY
Discharge: HOME | End: 2025-04-07
Attending: OPHTHALMOLOGY | Admitting: OPHTHALMOLOGY
Payer: MEDICARE

## 2025-04-07 ENCOUNTER — ANESTHESIA (OUTPATIENT)
Dept: OPERATING ROOM | Age: 85
End: 2025-04-07
Payer: MEDICARE

## 2025-04-07 VITALS
DIASTOLIC BLOOD PRESSURE: 78 MMHG | HEART RATE: 95 BPM | TEMPERATURE: 97.9 F | OXYGEN SATURATION: 93 % | SYSTOLIC BLOOD PRESSURE: 152 MMHG | HEIGHT: 68 IN | BODY MASS INDEX: 23.04 KG/M2 | WEIGHT: 152 LBS | RESPIRATION RATE: 18 BRPM

## 2025-04-07 PROCEDURE — 3600000012 HC SURGERY LEVEL 2 ADDTL 15MIN: Performed by: OPHTHALMOLOGY

## 2025-04-07 PROCEDURE — 6360000002 HC RX W HCPCS: Performed by: OPHTHALMOLOGY

## 2025-04-07 PROCEDURE — 2500000003 HC RX 250 WO HCPCS: Performed by: OPHTHALMOLOGY

## 2025-04-07 PROCEDURE — 7100000010 HC PHASE II RECOVERY - FIRST 15 MIN: Performed by: OPHTHALMOLOGY

## 2025-04-07 PROCEDURE — V2632 POST CHMBR INTRAOCULAR LENS: HCPCS | Performed by: OPHTHALMOLOGY

## 2025-04-07 PROCEDURE — 3700000001 HC ADD 15 MINUTES (ANESTHESIA): Performed by: OPHTHALMOLOGY

## 2025-04-07 PROCEDURE — 6370000000 HC RX 637 (ALT 250 FOR IP): Performed by: OPHTHALMOLOGY

## 2025-04-07 PROCEDURE — 7100000011 HC PHASE II RECOVERY - ADDTL 15 MIN: Performed by: OPHTHALMOLOGY

## 2025-04-07 PROCEDURE — 3600000002 HC SURGERY LEVEL 2 BASE: Performed by: OPHTHALMOLOGY

## 2025-04-07 PROCEDURE — 2709999900 HC NON-CHARGEABLE SUPPLY: Performed by: OPHTHALMOLOGY

## 2025-04-07 PROCEDURE — 3700000000 HC ANESTHESIA ATTENDED CARE: Performed by: OPHTHALMOLOGY

## 2025-04-07 PROCEDURE — 6360000002 HC RX W HCPCS: Performed by: ANESTHESIOLOGY

## 2025-04-07 DEVICE — STERILE UV AND BLUE LIGHT FILTERING ACRYLIC FOLDABLE ASPHERIC POSTERIOR CHAMBER INTRAOCULAR LENS
Type: IMPLANTABLE DEVICE | Site: EYE | Status: FUNCTIONAL
Brand: CLAREON

## 2025-04-07 RX ORDER — KETOROLAC TROMETHAMINE 5 MG/ML
1 SOLUTION OPHTHALMIC
Status: COMPLETED | OUTPATIENT
Start: 2025-04-07 | End: 2025-04-07

## 2025-04-07 RX ORDER — SODIUM CHLORIDE 0.9 % (FLUSH) 0.9 %
5-40 SYRINGE (ML) INJECTION PRN
Status: DISCONTINUED | OUTPATIENT
Start: 2025-04-07 | End: 2025-04-07 | Stop reason: HOSPADM

## 2025-04-07 RX ORDER — SODIUM CHLORIDE 0.9 % (FLUSH) 0.9 %
5-40 SYRINGE (ML) INJECTION EVERY 12 HOURS SCHEDULED
Status: DISCONTINUED | OUTPATIENT
Start: 2025-04-07 | End: 2025-04-07 | Stop reason: HOSPADM

## 2025-04-07 RX ORDER — PREDNISOLONE ACETATE 10 MG/ML
SUSPENSION/ DROPS OPHTHALMIC PRN
Status: DISCONTINUED | OUTPATIENT
Start: 2025-04-07 | End: 2025-04-07 | Stop reason: ALTCHOICE

## 2025-04-07 RX ORDER — SODIUM CHLORIDE 9 MG/ML
INJECTION, SOLUTION INTRAVENOUS PRN
Status: DISCONTINUED | OUTPATIENT
Start: 2025-04-07 | End: 2025-04-07 | Stop reason: HOSPADM

## 2025-04-07 RX ORDER — LIDOCAINE HYDROCHLORIDE 10 MG/ML
1 INJECTION, SOLUTION EPIDURAL; INFILTRATION; INTRACAUDAL; PERINEURAL
Status: COMPLETED | OUTPATIENT
Start: 2025-04-07 | End: 2025-04-07

## 2025-04-07 RX ORDER — CYCLOPENTOLATE HYDROCHLORIDE 10 MG/ML
1 SOLUTION/ DROPS OPHTHALMIC PRN
Status: COMPLETED | OUTPATIENT
Start: 2025-04-07 | End: 2025-04-07

## 2025-04-07 RX ORDER — TETRACAINE HYDROCHLORIDE 5 MG/ML
SOLUTION OPHTHALMIC PRN
Status: DISCONTINUED | OUTPATIENT
Start: 2025-04-07 | End: 2025-04-07 | Stop reason: ALTCHOICE

## 2025-04-07 RX ORDER — LIDOCAINE HYDROCHLORIDE 10 MG/ML
INJECTION, SOLUTION EPIDURAL; INFILTRATION; INTRACAUDAL; PERINEURAL PRN
Status: DISCONTINUED | OUTPATIENT
Start: 2025-04-07 | End: 2025-04-07 | Stop reason: ALTCHOICE

## 2025-04-07 RX ORDER — PROPARACAINE HYDROCHLORIDE 5 MG/ML
1 SOLUTION/ DROPS OPHTHALMIC EVERY 5 MIN PRN
Status: COMPLETED | OUTPATIENT
Start: 2025-04-07 | End: 2025-04-07

## 2025-04-07 RX ORDER — SODIUM CHLORIDE 9 MG/ML
INJECTION, SOLUTION INTRAVENOUS CONTINUOUS
Status: DISCONTINUED | OUTPATIENT
Start: 2025-04-07 | End: 2025-04-07 | Stop reason: HOSPADM

## 2025-04-07 RX ADMIN — KETOROLAC TROMETHAMINE 1 DROP: 0.5 SOLUTION OPHTHALMIC at 07:04

## 2025-04-07 RX ADMIN — Medication 1 DROP: at 07:04

## 2025-04-07 RX ADMIN — CYCLOPENTOLATE HYDROCHLORIDE 1 DROP: 10 SOLUTION OPHTHALMIC at 07:14

## 2025-04-07 RX ADMIN — Medication 1 DROP: at 07:24

## 2025-04-07 RX ADMIN — CYCLOPENTOLATE HYDROCHLORIDE 1 DROP: 10 SOLUTION OPHTHALMIC at 07:05

## 2025-04-07 RX ADMIN — CYCLOPENTOLATE HYDROCHLORIDE 1 DROP: 10 SOLUTION OPHTHALMIC at 07:24

## 2025-04-07 RX ADMIN — Medication 1 DROP: at 07:14

## 2025-04-07 RX ADMIN — LIDOCAINE HYDROCHLORIDE 1 ML: 10 INJECTION, SOLUTION EPIDURAL; INFILTRATION; INTRACAUDAL; PERINEURAL at 07:25

## 2025-04-07 ASSESSMENT — ENCOUNTER SYMPTOMS
COUGH: 1
GASTROINTESTINAL NEGATIVE: 1

## 2025-04-07 ASSESSMENT — PAIN - FUNCTIONAL ASSESSMENT: PAIN_FUNCTIONAL_ASSESSMENT: 0-10

## 2025-04-07 NOTE — OP NOTE
Operative Note      Patient: Jna Ventura  YOB: 1940  MRN: [id number]    Date of Procedure: 4/7/2025    Pre-Op Diagnosis Codes:      * Nuclear sclerotic cataract of left eye [H25.12]    Post-Op Diagnosis: Same       Procedure(s):  EYE CATARACT EMULSIFICATION INTRAOCULAR LENS IMPLANT    Surgeon(s):  Haim Valentine MD    Assistant:   * No surgical staff found *    Anesthesia: Monitor Anesthesia Care    Estimated Blood Loss (mL): Minimal    Complications: None    Specimens:   * No specimens in log *    Implants:  Implant Name Type Inv. Item Serial No.  Lot No. LRB No. Used Action   LENS CLAREON IOL 17.5D - [id number]  LENS CLAREON IOL 17.5D [id number] [employer]-  Left 1 Implanted         Drains: * No LDAs found *    Findings:  Infection Present At Time Of Surgery (PATOS) (choose all levels that have infection present):  No infection present  Other Findings: Cataract    Detailed Description of Procedure:   This is a 84-year-old gentleman having increasing difficulty with distance vision and driving specially with glare his best corrected vision is 20/40 with bright light testing he drops to 20/100.  He appears understand the risk benefits alternatives of cataract surgery in his left eye and wishes to proceed.The patient was brought back into the operating suite, placed in supine position, prepped and draped in usual standard fashion. A lid speculum was placed into the left eye. Approximately 0.3 cc of plain nonpreserved lidocaine was placed onto the eye topically. A paracentesis tract was made at the 2 o'clock position with an eye knife. Then, 0.3 cc of 1% non-preserved lidocaine was placed into the anterior chamber. This was then replaced with Viscoat. Attention was directed superiorly. A conjunctival peritomy was carried out from the 11 o'clock to 1 o'clock position. Hemostasis was obtained with wet-field cautery. A scratch incision was made at

## 2025-04-07 NOTE — H&P
HISTORY and PHYSICAL  [facility]       NAME:  Jan Ventura  MRN: [id number]   YOB: 1940   Date: 4/7/2025   Age: 84 y.o.  Gender: male       COMPLAINT AND PRESENT HISTORY:     Jan Ventura is 84 y.o.,  male, presents for EYE CATARACT EMULSIFICATION INTRAOCULAR LENS IMPLANT     Primary dx: Nuclear sclerotic cataract of left eye [H25.12].  HPI:  Jan Ventura is 84 y.o.,  male, complain of clouded, blurred or dim vision,trouble seeing at night, sensitivity to light and glare, need for brighter light for reading and other activities, and seeing \"halos\" around lights. Pt denies left eye pain, redness, itching or drainage.  Pt denies  previous left eye surgery, Pt using eye gtts as prescribed.  Pt has  family history of glaucoma in his mother  Medical history reviewed.  Pt denies recent or current chest pain/pressure, palpitations, SOB, recent URI, fever or chills.   NPO p MN. No am medication today   Pt stopped taking ASA one month ago     PAST MEDICAL HISTORY     Past Medical History:   Diagnosis Date    Back pain     Cancer (HCC)     skin cancer on top of head    Cataract     both eyes    Diarrhea     Hypertension     Seasonal allergies     Wears glasses        SURGICAL HISTORY       Past Surgical History:   Procedure Laterality Date    EYE SURGERY Right 3/24/2025    EYE CATARACT EMULSIFICATION INTRAOCULAR LENS IMPLANT performed by Haim Valentine MD at [facility] OR    LEG SURGERY Right     femur x 1 and tibia x 1 with hardware    MOHS SURGERY      top of scalp       FAMILY HISTORY     History reviewed. No pertinent family history.    SOCIAL HISTORY       Social History     Socioeconomic History    Marital status:      Spouse name: None    Number of children: None    Years of education: None    Highest education level: None   Tobacco Use    Smoking status: Never    Smokeless tobacco: Never   Vaping Use    Vaping status: Never Used   Substance and Sexual Activity    Drug use: No

## 2025-04-07 NOTE — ANESTHESIA PRE PROCEDURE
\"HEPCAB\"    COVID-19 Screening (If Applicable): No results found for: \"COVID19\"        Anesthesia Evaluation  Patient summary reviewed and Nursing notes reviewed   no history of anesthetic complications:   Airway: Mallampati: II  TM distance: >3 FB   Neck ROM: full  Mouth opening: > = 3 FB   Dental: normal exam         Pulmonary:Negative Pulmonary ROS and normal exam  breath sounds clear to auscultation                             Cardiovascular:  Exercise tolerance: good (>4 METS)  (+) hypertension:      ECG reviewed  Rhythm: regular  Rate: normal           Beta Blocker:  Not on Beta Blocker         Neuro/Psych:   Negative Neuro/Psych ROS              GI/Hepatic/Renal:            ROS comment: BPH.   Endo/Other: Negative Endo/Other ROS                     ROS comment: Cataract both eyes Abdominal:             Vascular: negative vascular ROS.         Other Findings:             Anesthesia Plan      MAC     ASA 2       Induction: intravenous.    MIPS: Prophylactic antiemetics administered.  Anesthetic plan and risks discussed with patient and spouse.      Plan discussed with CRNA.                  Judah Rojas MD   4/7/2025

## 2025-04-07 NOTE — ANESTHESIA POSTPROCEDURE EVALUATION
Department of Anesthesiology  Postprocedure Note    Patient: Jan Ventura  MRN: [id number]  YOB: 1940  Date of evaluation: 4/7/2025    Procedure Summary       Date: 04/07/25 Room / Location: [address]    Anesthesia Start: 0826 Anesthesia Stop: 0856    Procedure: EYE CATARACT EMULSIFICATION INTRAOCULAR LENS IMPLANT (Left: Eye) Diagnosis:       Nuclear sclerotic cataract of left eye      (Nuclear sclerotic cataract of left eye [H25.12])    Surgeons: Haim Valentine MD Responsible Provider: Judah Rojas MD    Anesthesia Type: General ASA Status: 2            Anesthesia Type: General    Luiz Phase I: Luiz Score: 10    Luiz Phase II: Luiz Score: 10    Anesthesia Post Evaluation    Comments: POST- ANESTHESIA EVALUATION       Pt Name: Jan Ventura  MRN: [id number]  YOB: 1940  Date of evaluation: 4/7/2025  Time:  11:09 AM      BP (!) 152/78   Pulse 95   Temp 97.9 °F (36.6 °C) (Temporal)   Resp 18   Ht 1.727 m (5' 7.99\")   Wt 68.9 kg (152 lb)   SpO2 93%   BMI 23.12 kg/m²      Consciousness Level  Awake  Cardiopulmonary Status  Stable  Pain Adequately Treated YES  Nausea / Vomiting  NO  Adequate Hydration  YES  Anesthesia Related Complications NONE      Electronically signed by Judah Rojas MD on 4/7/2025 at 11:09 AM           No notable events documented.

## (undated) DEVICE — THE MONARCH® "B" CARTRIDGE IS A SINGLE-USE POLYPROPYLENE CARTRIDGE FOR POSTERIOR CHAMBER IOL DELIVERY: Brand: MONARCH® II

## (undated) DEVICE — CORD,CAUTERY,BIPOLAR,STERILE: Brand: MEDLINE

## (undated) DEVICE — PACK CATARACT SURGI+CARE CUSTOM

## (undated) DEVICE — SOLUTION IRRIG 500ML BAL SALT FLX BG BSS

## (undated) DEVICE — GOWN,SIRUS,NONRNF,XLN/XL,20/CS: Brand: MEDLINE

## (undated) DEVICE — GOWN,AURORA,NONREINFORCED,LARGE: Brand: MEDLINE

## (undated) DEVICE — GLOVE SURG SZ 75 CRM LTX FREE POLYISOPRENE POLYMER BEAD ANTI

## (undated) DEVICE — SWABSTICK MEDICATED 10% POVIDONE IOD PVP TRIPE ANTISEP PREP 3 PER PK

## (undated) DEVICE — TOWEL,OR,DSP,ST,WHITE,DLX,2/PK,40PK/CS: Brand: MEDLINE

## (undated) DEVICE — EYE SPEAR / FINE DISSECTOR: Brand: DEROYAL

## (undated) DEVICE — PENCIL ELECSURG BPLR 18 GA DISP

## (undated) DEVICE — DRESSING TRNSPAR W2XL2.75IN FLM SHT SEMIPERMEABLE WIND

## (undated) DEVICE — MARKER,SKIN,WI/RULER AND LABELS: Brand: MEDLINE